# Patient Record
Sex: MALE | Race: WHITE | NOT HISPANIC OR LATINO | Employment: FULL TIME | URBAN - METROPOLITAN AREA
[De-identification: names, ages, dates, MRNs, and addresses within clinical notes are randomized per-mention and may not be internally consistent; named-entity substitution may affect disease eponyms.]

---

## 2019-08-22 ENCOUNTER — OFFICE VISIT (OUTPATIENT)
Dept: URGENT CARE | Facility: PHYSICIAN GROUP | Age: 69
End: 2019-08-22
Payer: COMMERCIAL

## 2019-08-22 VITALS
RESPIRATION RATE: 14 BRPM | BODY MASS INDEX: 28.23 KG/M2 | WEIGHT: 220 LBS | HEART RATE: 67 BPM | OXYGEN SATURATION: 98 % | HEIGHT: 74 IN | SYSTOLIC BLOOD PRESSURE: 132 MMHG | TEMPERATURE: 97.4 F | DIASTOLIC BLOOD PRESSURE: 80 MMHG

## 2019-08-22 DIAGNOSIS — R53.81 MALAISE AND FATIGUE: ICD-10-CM

## 2019-08-22 DIAGNOSIS — J06.9 VIRAL URI: ICD-10-CM

## 2019-08-22 DIAGNOSIS — R53.83 MALAISE AND FATIGUE: ICD-10-CM

## 2019-08-22 LAB
APPEARANCE UR: CLEAR
BILIRUB UR STRIP-MCNC: NEGATIVE MG/DL
COLOR UR AUTO: YELLOW
FLUAV+FLUBV AG SPEC QL IA: NEGATIVE
GLUCOSE UR STRIP.AUTO-MCNC: NEGATIVE MG/DL
INT CON NEG: NEGATIVE
INT CON POS: POSITIVE
KETONES UR STRIP.AUTO-MCNC: NORMAL MG/DL
LEUKOCYTE ESTERASE UR QL STRIP.AUTO: NEGATIVE
NITRITE UR QL STRIP.AUTO: NEGATIVE
PH UR STRIP.AUTO: 5.5 [PH] (ref 5–8)
PROT UR QL STRIP: NEGATIVE MG/DL
RBC UR QL AUTO: NEGATIVE
SP GR UR STRIP.AUTO: 1.03
UROBILINOGEN UR STRIP-MCNC: NORMAL MG/DL

## 2019-08-22 PROCEDURE — 99203 OFFICE O/P NEW LOW 30 MIN: CPT | Performed by: PHYSICIAN ASSISTANT

## 2019-08-22 PROCEDURE — 87804 INFLUENZA ASSAY W/OPTIC: CPT | Performed by: PHYSICIAN ASSISTANT

## 2019-08-22 PROCEDURE — 81002 URINALYSIS NONAUTO W/O SCOPE: CPT | Performed by: PHYSICIAN ASSISTANT

## 2019-08-22 SDOH — HEALTH STABILITY: MENTAL HEALTH: HOW OFTEN DO YOU HAVE A DRINK CONTAINING ALCOHOL?: MONTHLY OR LESS

## 2019-08-22 SDOH — HEALTH STABILITY: MENTAL HEALTH: HOW MANY STANDARD DRINKS CONTAINING ALCOHOL DO YOU HAVE ON A TYPICAL DAY?: 1 OR 2

## 2019-08-22 SDOH — HEALTH STABILITY: MENTAL HEALTH: HOW OFTEN DO YOU HAVE 6 OR MORE DRINKS ON ONE OCCASION?: NEVER

## 2019-08-22 ASSESSMENT — ENCOUNTER SYMPTOMS
NAUSEA: 0
VOMITING: 0
MYALGIAS: 1
HEADACHES: 1
COUGH: 0
CHILLS: 1
ABDOMINAL PAIN: 0
FATIGUE: 1
SORE THROAT: 0
FEVER: 1
CHANGE IN BOWEL HABIT: 0

## 2019-08-22 NOTE — PROGRESS NOTES
"Subjective:   Amando Pleitez is a 68 y.o. male who presents for Fever (x 24 hrs// body aches)        Influenza   This is a new problem. The current episode started yesterday. The problem occurs constantly. The problem has been unchanged. Associated symptoms include chills, fatigue, a fever, headaches (dull) and myalgias (\"kind of\"). Pertinent negatives include no abdominal pain, change in bowel habit, chest pain, congestion, coughing, nausea, sore throat or vomiting. Associated symptoms comments: lethargic. The symptoms are aggravated by stress and walking. He has tried nothing for the symptoms. The treatment provided no relief.     Review of Systems   Constitutional: Positive for chills, fatigue and fever.   HENT: Negative for congestion and sore throat.    Respiratory: Negative for cough.    Cardiovascular: Negative for chest pain.   Gastrointestinal: Negative for abdominal pain, change in bowel habit, nausea and vomiting.   Musculoskeletal: Positive for myalgias (\"kind of\").   Neurological: Positive for headaches (dull).       PMH:  has no past medical history of Allergy.  MEDS: No current outpatient medications on file.  ALLERGIES: No Known Allergies  SURGHX: History reviewed. No pertinent surgical history.  SOCHX:  reports that he has never smoked. He has never used smokeless tobacco. He reports that he drinks alcohol. He reports that he does not use drugs.  FH: Family history was reviewed, no pertinent findings to report   Objective:   /80   Pulse 67   Temp 36.3 °C (97.4 °F) (Temporal)   Resp 14   Ht 1.88 m (6' 2\")   Wt 99.8 kg (220 lb)   SpO2 98%   BMI 28.25 kg/m²   Physical Exam   Constitutional: He is oriented to person, place, and time. He appears well-developed and well-nourished.  Non-toxic appearance. No distress.   HENT:   Head: Normocephalic and atraumatic.   Right Ear: Hearing, tympanic membrane, external ear and ear canal normal.   Left Ear: Hearing, tympanic membrane, external ear and ear " canal normal.   Nose: No mucosal edema or rhinorrhea. Right sinus exhibits no maxillary sinus tenderness. Left sinus exhibits no maxillary sinus tenderness.   Mouth/Throat: Uvula is midline, oropharynx is clear and moist and mucous membranes are normal.   Eyes: Conjunctivae and lids are normal.   Neck: Neck supple.   Cardiovascular: Normal rate, regular rhythm, S1 normal, S2 normal and normal heart sounds. Exam reveals no gallop and no friction rub.   No murmur heard.  Pulmonary/Chest: Effort normal and breath sounds normal. No respiratory distress. He has no decreased breath sounds. He has no wheezes. He has no rhonchi. He has no rales.   Abdominal: Normal appearance.   Musculoskeletal:   Normal range of motion. Exhibits no edema and no tenderness.    Lymphadenopathy:     He has no cervical adenopathy.        Right cervical: No superficial cervical and no posterior cervical adenopathy present.       Left cervical: No superficial cervical and no posterior cervical adenopathy present.   Neurological: He is alert and oriented to person, place, and time. No cranial nerve deficit or sensory deficit.   Skin: Skin is warm, dry and intact. Capillary refill takes less than 2 seconds.   Psychiatric: He has a normal mood and affect. His speech is normal and behavior is normal. Judgment and thought content normal. Cognition and memory are normal.   Vitals reviewed.        Assessment/Plan:   1. Viral URI    2. Malaise and fatigue  - POCT Urinalysis  - POCT Influenza A/B    Flu testing negative.  UA unremarkable.  Patient advised that symptoms most likely due to other viral illness.  However pt advised that symptoms could also be due to another considerations include an illness that has not yet fully manifested itself yet. I will tx symptomatically at this time with f/u precautions.    VSS, no dyspnea, no SOB, and lungs CTA on PE.  Consistent with viral URI without lower respiratory involvement. Goals of care include symptomatic  control and prevention of lower respiratory spread. Signs of lower respiratory involvement discussed with pt.  Pt instructed to RTC if any of these are observed.     Drink plenty of fluids and rest.  NSAIDs as needed for headache and body aches.  APAP for fever control, and ibuprofen for throat pain/headache relief prn.    If you fail to improve in 2-4 days or symptoms worsen/new symptoms develop, RTC for reevaluation.    Differential diagnosis, natural history, supportive care, and indications for immediate follow-up discussed.

## 2019-08-28 ENCOUNTER — TELEPHONE (OUTPATIENT)
Dept: SCHEDULING | Facility: IMAGING CENTER | Age: 69
End: 2019-08-28

## 2020-03-09 ENCOUNTER — OFFICE VISIT (OUTPATIENT)
Dept: URGENT CARE | Facility: CLINIC | Age: 70
End: 2020-03-09
Payer: MEDICARE

## 2020-03-09 ENCOUNTER — HOSPITAL ENCOUNTER (OUTPATIENT)
Facility: MEDICAL CENTER | Age: 70
End: 2020-03-09
Attending: PHYSICIAN ASSISTANT
Payer: MEDICARE

## 2020-03-09 VITALS — HEART RATE: 95 BPM | TEMPERATURE: 98.1 F | OXYGEN SATURATION: 93 %

## 2020-03-09 DIAGNOSIS — R09.81 NASAL CONGESTION: ICD-10-CM

## 2020-03-09 LAB
FLUAV RNA SPEC QL NAA+PROBE: NEGATIVE
FLUAV+FLUBV AG SPEC QL IA: NEGATIVE
FLUBV RNA SPEC QL NAA+PROBE: NEGATIVE
INT CON NEG: NEGATIVE
INT CON POS: POSITIVE
S PYO DNA SPEC NAA+PROBE: NOT DETECTED

## 2020-03-09 PROCEDURE — 87502 INFLUENZA DNA AMP PROBE: CPT | Mod: XU

## 2020-03-09 PROCEDURE — 87804 INFLUENZA ASSAY W/OPTIC: CPT | Performed by: PHYSICIAN ASSISTANT

## 2020-03-09 PROCEDURE — 99213 OFFICE O/P EST LOW 20 MIN: CPT | Performed by: PHYSICIAN ASSISTANT

## 2020-03-09 PROCEDURE — 87486 CHLMYD PNEUM DNA AMP PROBE: CPT

## 2020-03-09 PROCEDURE — 87581 M.PNEUMON DNA AMP PROBE: CPT

## 2020-03-09 PROCEDURE — 87651 STREP A DNA AMP PROBE: CPT

## 2020-03-09 PROCEDURE — 87633 RESP VIRUS 12-25 TARGETS: CPT

## 2020-03-09 ASSESSMENT — ENCOUNTER SYMPTOMS
NAUSEA: 0
SHORTNESS OF BREATH: 0
ABDOMINAL PAIN: 0
COUGH: 1
WHEEZING: 0
MYALGIAS: 0
SPUTUM PRODUCTION: 0
CHILLS: 0
HEADACHES: 1
VOMITING: 0
FEVER: 0
DIARRHEA: 0

## 2020-03-09 NOTE — PROGRESS NOTES
"Subjective:      Amando Pleitez is a 69 y.o. male who presents with Nasal Congestion            HPI  Male who presents the clinic with a head cold.  He recently flew in from [facility] with a stop in [facility] within 14 days.  He presents today complaining of several days of \"head cold\" with a headache.  He also has associated nasal congestion and mild cough.  He denies any fevers, chills, body aches, abdominal pain, nausea, vomiting, diarrhea.  He has no further complaints.        Review of Systems   Constitutional: Negative for chills, fever and malaise/fatigue.   HENT: Positive for congestion.    Respiratory: Positive for cough. Negative for sputum production, shortness of breath and wheezing.    Cardiovascular: Negative for chest pain.   Gastrointestinal: Negative for abdominal pain, diarrhea, nausea and vomiting.   Musculoskeletal: Negative for myalgias.   Neurological: Positive for headaches.          Objective:     Pulse 95   Temp 36.7 °C (98.1 °F) (Temporal)   SpO2 93%      Physical Exam  Vitals signs reviewed.   Constitutional:       General: He is not in acute distress.     Appearance: Normal appearance. He is not ill-appearing, toxic-appearing or diaphoretic.      Comments: No signs of respiratory distress.   Standing in parking lot      HENT:      Nose: Congestion and rhinorrhea present.   Eyes:      Conjunctiva/sclera: Conjunctivae normal.   Pulmonary:      Effort: No tachypnea, accessory muscle usage or respiratory distress.   Neurological:      General: No focal deficit present.      Mental Status: He is alert and oriented to person, place, and time.   Psychiatric:         Mood and Affect: Mood normal.         Behavior: Behavior normal.       No past medical history on file. No past surgical history on file.   Social History     Socioeconomic History   • Marital status:      Spouse name: Not on file   • Number of children: Not on file   • Years of education: Not on file   • Highest " education level: Not on file   Occupational History   • Not on file   Social Needs   • Financial resource strain: Not on file   • Food insecurity     Worry: Not on file     Inability: Not on file   • Transportation needs     Medical: Not on file     Non-medical: Not on file   Tobacco Use   • Smoking status: Never Smoker   • Smokeless tobacco: Never Used   Substance and Sexual Activity   • Alcohol use: Yes     Frequency: Monthly or less     Drinks per session: 1 or 2     Binge frequency: Never   • Drug use: Never   • Sexual activity: Not on file   Lifestyle   • Physical activity     Days per week: Not on file     Minutes per session: Not on file   • Stress: Not on file   Relationships   • Social connections     Talks on phone: Not on file     Gets together: Not on file     Attends Spiritism service: Not on file     Active member of club or organization: Not on file     Attends meetings of clubs or organizations: Not on file     Relationship status: Not on file   • Intimate partner violence     Fear of current or ex partner: Not on file     Emotionally abused: Not on file     Physically abused: Not on file     Forced sexual activity: Not on file   Other Topics Concern   • Not on file   Social History Narrative   • Not on file    Patient has no known allergies.             Assessment/Plan:     1. Nasal congestion    - POCT Influenza A/B  - Influenza A/B By PCR (Adult - Flu Only); Future  - Group A Strep by PCR; Future    We will call back for influenza results if positive, otherwise assume negative..  Treatment of plenty of fluids, rest, ibuprofen Tylenol for fevers or chills, salt water gargles.  They will be contacted by [facility] if meets screening criteria for CoVID - 19.   Instructed to present to the emergency room with any severe difficulty breathing, severe abdominal pain, vomiting, fever or any other concerns.  Patient understood and agreed.

## 2020-03-10 ENCOUNTER — TELEPHONE (OUTPATIENT)
Dept: URGENT CARE | Facility: CLINIC | Age: 70
End: 2020-03-10

## 2020-03-10 LAB
C PNEUM DNA SPEC QL NAA+PROBE: NOT DETECTED
FLUAV H1 2009 PAND RNA SPEC QL NAA+PROBE: NOT DETECTED
FLUAV H1 RNA SPEC QL NAA+PROBE: NOT DETECTED
FLUAV H3 RNA SPEC QL NAA+PROBE: NOT DETECTED
FLUAV RNA SPEC QL NAA+PROBE: NOT DETECTED
FLUBV RNA SPEC QL NAA+PROBE: NOT DETECTED
HADV DNA SPEC QL NAA+PROBE: NOT DETECTED
HCOV RNA SPEC QL NAA+PROBE: DETECTED
HMPV RNA SPEC QL NAA+PROBE: NOT DETECTED
HPIV1 RNA SPEC QL NAA+PROBE: NOT DETECTED
HPIV2 RNA SPEC QL NAA+PROBE: NOT DETECTED
HPIV3 RNA SPEC QL NAA+PROBE: NOT DETECTED
HPIV4 RNA SPEC QL NAA+PROBE: NOT DETECTED
M PNEUMO DNA SPEC QL NAA+PROBE: NOT DETECTED
RSV A RNA SPEC QL NAA+PROBE: NOT DETECTED
RSV B RNA SPEC QL NAA+PROBE: NOT DETECTED
RV+EV RNA SPEC QL NAA+PROBE: NOT DETECTED

## 2020-10-23 ENCOUNTER — TELEPHONE (OUTPATIENT)
Dept: SCHEDULING | Facility: IMAGING CENTER | Age: 70
End: 2020-10-23

## 2020-10-27 ENCOUNTER — OFFICE VISIT (OUTPATIENT)
Dept: MEDICAL GROUP | Facility: PHYSICIAN GROUP | Age: 70
End: 2020-10-27
Payer: MEDICARE

## 2020-10-27 VITALS
BODY MASS INDEX: 28.99 KG/M2 | HEART RATE: 86 BPM | DIASTOLIC BLOOD PRESSURE: 80 MMHG | TEMPERATURE: 98.6 F | WEIGHT: 214 LBS | OXYGEN SATURATION: 96 % | HEIGHT: 72 IN | RESPIRATION RATE: 16 BRPM | SYSTOLIC BLOOD PRESSURE: 134 MMHG

## 2020-10-27 DIAGNOSIS — Z13.6 SCREENING FOR CARDIOVASCULAR CONDITION: ICD-10-CM

## 2020-10-27 DIAGNOSIS — R41.3 MEMORY CHANGES: ICD-10-CM

## 2020-10-27 DIAGNOSIS — Z23 NEED FOR VACCINATION: ICD-10-CM

## 2020-10-27 DIAGNOSIS — Z13.21 ENCOUNTER FOR VITAMIN DEFICIENCY SCREENING: ICD-10-CM

## 2020-10-27 DIAGNOSIS — K59.01 SLOW TRANSIT CONSTIPATION: ICD-10-CM

## 2020-10-27 DIAGNOSIS — Z11.59 NEED FOR HEPATITIS C SCREENING TEST: ICD-10-CM

## 2020-10-27 DIAGNOSIS — Z13.29 SCREENING FOR THYROID DISORDER: ICD-10-CM

## 2020-10-27 DIAGNOSIS — Z13.1 SCREENING FOR DIABETES MELLITUS: ICD-10-CM

## 2020-10-27 DIAGNOSIS — Z13.0 ENCOUNTER FOR SCREENING FOR HEMATOLOGIC DISORDER: ICD-10-CM

## 2020-10-27 PROCEDURE — 90732 PPSV23 VACC 2 YRS+ SUBQ/IM: CPT | Performed by: NURSE PRACTITIONER

## 2020-10-27 PROCEDURE — 99204 OFFICE O/P NEW MOD 45 MIN: CPT | Mod: 25 | Performed by: NURSE PRACTITIONER

## 2020-10-27 PROCEDURE — G0009 ADMIN PNEUMOCOCCAL VACCINE: HCPCS | Performed by: NURSE PRACTITIONER

## 2020-10-27 SDOH — HEALTH STABILITY: MENTAL HEALTH: HOW OFTEN DO YOU HAVE A DRINK CONTAINING ALCOHOL?: 4 OR MORE TIMES A WEEK

## 2020-10-27 ASSESSMENT — PATIENT HEALTH QUESTIONNAIRE - PHQ9: CLINICAL INTERPRETATION OF PHQ2 SCORE: 0

## 2020-10-27 NOTE — PATIENT INSTRUCTIONS
Benefiber supplement    Increase water intake to at least 64 ounces per day and increase fruits and vegetable    Labs anytime soon, they are fasting    Follow up in 6-8 weeks

## 2020-10-27 NOTE — PROGRESS NOTES
"Chief Complaint   Patient presents with   • Establish Care         This is a 70 y.o.male patient that presents today with the following: Establish care with new PCP, discuss chronic conditions    Slow transit constipation  Patient reports \"bowel issues\" off and on for the past couple of months  He reports to me that he is up-to-date with colonoscopy, this was done in 2015 and was given a 10-year recall  He mostly complains of symptoms of constipation and increased intestinal gas  He does admit to me that he eats very little fruits and vegetables and drinks an adequate amounts of water however he is quite active  We did discuss at length constipation prevention to include increasing fluid intake as well as fiber  Could also consider taking MiraLAX over-the-counter 1 capful up to 3-4 times a day until regular and then daily as needed  Patient like to hold off on the MiraLAX and try increasing water and fiber    Memory changes  Patient reports issues with his memory  They are very subtle  Patient's wife accompanies him today and does report that his hearing has changed which could possibly be causing the changes to his memory  He is not getting lost, he does not forget the names of loved ones  However he has forgotten the names of people in his Restoration as he is a  but he is the  of a Restoration well over 300 people  Discussed with him normal memory changes we all age  Patient and his wife will continue to monitor this  He does deny any other associated neurological symptoms  He is fully oriented x4      No visits with results within 1 Month(s) from this visit.   Latest known visit with results is:   Hospital Outpatient Visit on 03/09/2020   Component Date Value   • Group A Strep by PCR 03/09/2020 Not Detected    • Influenza virus A RNA 03/09/2020 Negative    • Influenza virus B, PCR 03/09/2020 Negative    • Adenovirus, PCR 03/09/2020 Not Detected    • Coronavirus, PCR 03/09/2020 DETECTED*   • Human " Metapneumovirus, P* 03/09/2020 Not Detected    • Rhinovirus / Enterovirus* 03/09/2020 Not Detected    • Influenza virus A RNA 03/09/2020 Not Detected    • Influenza virus A H1 RNA 03/09/2020 Not Detected    • Influenza A 2009, H1N1, * 03/09/2020 Not Detected    • Influenza virus A H3 RNA 03/09/2020 Not Detected    • Influenza virus B, PCR 03/09/2020 Not Detected    • Parainfluenza virus 1, P* 03/09/2020 Not Detected    • Parainfluenza virus 2, P* 03/09/2020 Not Detected    • Parainfluenza virus 3, P* 03/09/2020 Not Detected    • Parainfluenza 4, PCR 03/09/2020 Not Detected    • Resp Syncytial Virus A, * 03/09/2020 Not Detected    • Resp Syncytial Virus B, * 03/09/2020 Not Detected    • Chlamydia pneumoniae, PCR 03/09/2020 Not Detected    • Mycoplasma pneumoniae, P* 03/09/2020 Not Detected          clinical course has been stable    No past medical history on file.    No past surgical history on file.    No family history on file.    Patient has no known allergies.    No current [facility]-ordered outpatient medications on file.     No current [facility]-ordered facility-administered medications on file.        Constitutional ROS: No unexpected change in weight, No weakness, No unexplained fevers, sweats, or chills  Pulmonary ROS: No chronic cough, sputum, or hemoptysis, No shortness of breath, No recent change in breathing  Cardiovascular ROS: No chest pain  Gastrointestinal ROS: Positive for constipation   Musculoskeletal/Extremities ROS: No clubbing, No peripheral edema, No pain, redness or swelling on the joints  Neurologic ROS: Normal development, No seizures, No weakness  Psychiatric ROS: Positive for memory changes    Physical exam:  /80   Pulse 86   Temp 37 °C (98.6 °F) (Temporal)   Resp 16   Ht 1.829 m (6')   Wt 97.1 kg (214 lb)   SpO2 96%   BMI 29.02 kg/m²   General Appearance: Very pleasant older male, alert, no distress, moderately overweight, well-groomed  Skin: Skin color, texture, turgor normal. No  rashes or lesions.  Neck: negative findings: no asymmetry, masses, or scars, no adenopathy, trachea midline and normal to palpitation  Lungs: negative findings: normal respiratory rate and rhythm, lungs clear to auscultation  Heart: negative. RRR without murmur, gallop, or rubs.  No ectopy.  Abdomen: Abdomen soft, non-tender. BS normal. No masses,  No organomegaly  Musculoskeletal: negative findings: no evidence of joint instability, no evidence of muscle atrophy, no deformities present  Peripheral Pulses: Normal  Neurologic: intact, CN II through XII grossly intact    Medical decision making/discussion:     Benefiber supplement    Increase water intake to at least 64 ounces per day and increase fruits and vegetable    Labs anytime soon, they are fasting    Follow up in 6-8 weeks    Amando was seen today for establish care.    Diagnoses and all orders for this visit:    Memory changes    Slow transit constipation    Need for hepatitis C screening test  -     HCV Scrn ( 5584-7187 1xLife); Future    Need for vaccination  -     Pneumovax Vaccine (PPSV23)    Screening for cardiovascular condition  -     Comp Metabolic Panel; Future  -     Lipid Profile; Future    Screening for diabetes mellitus  -     HEMOGLOBIN A1C; Future    Screening for thyroid disorder  -     TSH WITH REFLEX TO FT4; Future    Encounter for vitamin deficiency screening  -     VITAMIN D,25 HYDROXY; Future    Encounter for screening for hematologic disorder  -     CBC WITH DIFFERENTIAL; Future    Other orders  -     Cancel: REFERRAL TO GI FOR COLONOSCOPY        Return in about 2 months (around 2020) for Follow-up, Discuss Labs.        Please note that this dictation was created using voice recognition software. I have made every reasonable attempt to correct obvious errors, but I expect that there are errors of grammar and possibly content that I did not discover before finalizing the note.

## 2020-10-28 PROBLEM — K59.01 SLOW TRANSIT CONSTIPATION: Status: ACTIVE | Noted: 2020-10-28

## 2020-10-28 PROBLEM — R41.3 MEMORY CHANGES: Status: ACTIVE | Noted: 2020-10-28

## 2020-10-28 NOTE — ASSESSMENT & PLAN NOTE
"Patient reports \"bowel issues\" off and on for the past couple of months  He reports to me that he is up-to-date with colonoscopy, this was done in 2015 and was given a 10-year recall  He mostly complains of symptoms of constipation and increased intestinal gas  He does admit to me that he eats very little fruits and vegetables and drinks an adequate amounts of water however he is quite active  We did discuss at length constipation prevention to include increasing fluid intake as well as fiber  Could also consider taking MiraLAX over-the-counter 1 capful up to 3-4 times a day until regular and then daily as needed  Patient like to hold off on the MiraLAX and try increasing water and fiber  "

## 2020-10-28 NOTE — ASSESSMENT & PLAN NOTE
Patient reports issues with his memory  They are very subtle  Patient's wife accompanies him today and does report that his hearing has changed which could possibly be causing the changes to his memory  He is not getting lost, he does not forget the names of loved ones  However he has forgotten the names of people in his Denominational as he is a  but he is the  of a Denominational well over 300 people  Discussed with him normal memory changes we all age  Patient and his wife will continue to monitor this  He does deny any other associated neurological symptoms  He is fully oriented x4

## 2021-01-11 ENCOUNTER — HOSPITAL ENCOUNTER (OUTPATIENT)
Dept: LAB | Facility: MEDICAL CENTER | Age: 71
End: 2021-01-11
Attending: NURSE PRACTITIONER
Payer: MEDICARE

## 2021-01-11 DIAGNOSIS — Z13.21 ENCOUNTER FOR VITAMIN DEFICIENCY SCREENING: ICD-10-CM

## 2021-01-11 DIAGNOSIS — Z11.59 NEED FOR HEPATITIS C SCREENING TEST: ICD-10-CM

## 2021-01-11 DIAGNOSIS — Z13.0 ENCOUNTER FOR SCREENING FOR HEMATOLOGIC DISORDER: ICD-10-CM

## 2021-01-11 DIAGNOSIS — Z13.1 SCREENING FOR DIABETES MELLITUS: ICD-10-CM

## 2021-01-11 DIAGNOSIS — Z13.6 SCREENING FOR CARDIOVASCULAR CONDITION: ICD-10-CM

## 2021-01-11 DIAGNOSIS — Z13.29 SCREENING FOR THYROID DISORDER: ICD-10-CM

## 2021-03-03 DIAGNOSIS — Z23 NEED FOR VACCINATION: ICD-10-CM

## 2021-03-17 ENCOUNTER — IMMUNIZATION (OUTPATIENT)
Dept: FAMILY PLANNING/WOMEN'S HEALTH CLINIC | Facility: IMMUNIZATION CENTER | Age: 71
End: 2021-03-17
Attending: INTERNAL MEDICINE
Payer: MEDICARE

## 2021-03-17 DIAGNOSIS — Z23 NEED FOR VACCINATION: ICD-10-CM

## 2021-03-17 DIAGNOSIS — Z23 ENCOUNTER FOR VACCINATION: Primary | ICD-10-CM

## 2021-03-17 PROCEDURE — 91300 PFIZER SARS-COV-2 VACCINE: CPT | Performed by: INTERNAL MEDICINE

## 2021-03-17 PROCEDURE — 0001A PFIZER SARS-COV-2 VACCINE: CPT | Performed by: INTERNAL MEDICINE

## 2021-04-08 ENCOUNTER — IMMUNIZATION (OUTPATIENT)
Dept: FAMILY PLANNING/WOMEN'S HEALTH CLINIC | Facility: IMMUNIZATION CENTER | Age: 71
End: 2021-04-08
Attending: INTERNAL MEDICINE
Payer: MEDICARE

## 2021-04-08 DIAGNOSIS — Z23 ENCOUNTER FOR VACCINATION: Primary | ICD-10-CM

## 2021-04-08 PROCEDURE — 91300 PFIZER SARS-COV-2 VACCINE: CPT

## 2021-04-08 PROCEDURE — 0002A PFIZER SARS-COV-2 VACCINE: CPT

## 2021-11-15 ENCOUNTER — OFFICE VISIT (OUTPATIENT)
Dept: MEDICAL GROUP | Facility: PHYSICIAN GROUP | Age: 71
End: 2021-11-15
Payer: MEDICARE

## 2021-11-15 VITALS
OXYGEN SATURATION: 97 % | HEIGHT: 72 IN | TEMPERATURE: 97.8 F | BODY MASS INDEX: 29.31 KG/M2 | WEIGHT: 216.4 LBS | DIASTOLIC BLOOD PRESSURE: 60 MMHG | RESPIRATION RATE: 18 BRPM | SYSTOLIC BLOOD PRESSURE: 130 MMHG | HEART RATE: 65 BPM

## 2021-11-15 DIAGNOSIS — E78.5 HYPERLIPIDEMIA, UNSPECIFIED HYPERLIPIDEMIA TYPE: ICD-10-CM

## 2021-11-15 DIAGNOSIS — R41.3 MEMORY CHANGES: ICD-10-CM

## 2021-11-15 DIAGNOSIS — Z00.00 HEALTHCARE MAINTENANCE: ICD-10-CM

## 2021-11-15 DIAGNOSIS — K59.01 SLOW TRANSIT CONSTIPATION: ICD-10-CM

## 2021-11-15 PROCEDURE — 99213 OFFICE O/P EST LOW 20 MIN: CPT | Performed by: STUDENT IN AN ORGANIZED HEALTH CARE EDUCATION/TRAINING PROGRAM

## 2021-11-15 ASSESSMENT — PATIENT HEALTH QUESTIONNAIRE - PHQ9: CLINICAL INTERPRETATION OF PHQ2 SCORE: 0

## 2021-11-15 NOTE — PROGRESS NOTES
Subjective:     CC:  Diagnoses of Hyperlipidemia, unspecified hyperlipidemia type, Memory changes, Slow transit constipation, and Healthcare maintenance were pertinent to this visit.    HISTORY OF THE PRESENT ILLNESS: Patient is a 71 y.o. male. This pleasant patient is here today to establish care. His prior PCP was KOREY Arevalo.    1.  Hyperlipidemia  Mr. Pleitez is unsure of how he received this diagnosis.  We have no lipid results on him.    2.  Memory changes  He feels that his memory is stable.  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  He states that his memory is not quite as good as it used to be but he feels that it is still quite adequate.    3.  Slow transit constipation  He feels that this is mostly resolved at this point.    Active Diagnosis:    Patient Active Problem List   Diagnosis   • Hyperlipidemia   • Abnormal colonoscopy   • Memory changes   • Slow transit constipation        No current Epic-ordered outpatient medications on file.     No current Epic-ordered facility-administered medications on file.     ROS:   Gen: No fevers/chills, no changes in weight  HEENT: No changes in vision/hearing, sore throat.  Pulm: No cough, unexplained SOB.  CV: No chest pain/pressure, no palpitations  GI: No nausea/vomiting, no diarrhea  : No dysuria/nocturia  MSk: No myalgias  Skin: No rash/skin changes  Neuro: No headaches, no numbness/tingling  Heme/Lymph: no easy bruising      Objective:     Exam: /60 (BP Location: Left arm, Patient Position: Sitting, BP Cuff Size: Large adult)   Pulse 65   Temp 36.6 °C (97.8 °F) (Temporal)   Resp 18   Ht 1.829 m (6')   Wt 98.2 kg (216 lb 6.4 oz)   SpO2 97%  Body mass index is 29.35 kg/m².    General: Normal appearing. No distress.  HEENT: Normocephalic. Eyes conjunctiva clear lids without ptosis, pupils equal and reactive to light accommodation. Ears normal shape and contour, canals are clear bilaterally,  tympanic membranes are benign, nasal mucosa benign, oropharynx is without erythema, edema or exudates.   Neck: Supple without JVD or bruit. Thyroid is not enlarged.  Pulmonary: Clear to ausculation.  Normal effort. No rales, ronchi, or wheezing.  Cardiovascular: Regular rate and rhythm without murmur. Radial pulses are intact and equal bilaterally.  Abdomen: Soft, nontender, nondistended. Normal bowel sounds. Liver and spleen are not palpable  Neurologic: Grossly nonfocal.  CN II through XII intact.  Lymph: No cervical or supraclavicular lymph nodes are palpable  Skin: Warm and dry.  No obvious lesions.  Musculoskeletal: Normal gait. No extremity cyanosis, clubbing, or edema.  Psych: Normal mood and affect. Alert and oriented x3. Judgment and insight are normal.    A chaperone was offered to the patient during today's exam. Patient declined chaperone.    Labs:   No pertinent labs available    Assessment & Plan:   71 y.o. male with the following -    1. Hyperlipidemia, unspecified hyperlipidemia type  - lipid profile    2. Memory changes  -Chronic, stable.  Patient is very high functioning.    3. Slow transit constipation  -Resolved.    4.  Healthcare maintenance  -We discussed diet/exercise/healthy weight maintenance.  Discussed the need for biannual dentistry visits and to always wear a seatbelt.  -He is already scheduled for his influenza vaccine and COVID-19 booster at the pharmacy this afternoon.  -CBC, CMP, lipid profile.        Return in about 1 year (around 11/15/2022).    Please note that this dictation was created using voice recognition software. I have made every reasonable attempt to correct obvious errors, but I expect that there are errors of grammar and possibly content that I did not discover before finalizing the note.      Fox Pandya PA-C 11/15/2021

## 2021-12-07 ENCOUNTER — HOSPITAL ENCOUNTER (OUTPATIENT)
Dept: LAB | Facility: MEDICAL CENTER | Age: 71
End: 2021-12-07
Attending: STUDENT IN AN ORGANIZED HEALTH CARE EDUCATION/TRAINING PROGRAM
Payer: MEDICARE

## 2021-12-07 DIAGNOSIS — R74.8 ELEVATED ALKALINE PHOSPHATASE LEVEL: ICD-10-CM

## 2021-12-07 DIAGNOSIS — Z00.00 HEALTHCARE MAINTENANCE: ICD-10-CM

## 2021-12-07 DIAGNOSIS — E78.5 HYPERLIPIDEMIA, UNSPECIFIED HYPERLIPIDEMIA TYPE: ICD-10-CM

## 2021-12-07 DIAGNOSIS — R73.01 ELEVATED FASTING BLOOD SUGAR: ICD-10-CM

## 2021-12-07 LAB
ALBUMIN SERPL BCP-MCNC: 4.2 G/DL (ref 3.2–4.9)
ALBUMIN/GLOB SERPL: 1.8 G/DL
ALP SERPL-CCNC: 123 U/L (ref 30–99)
ALT SERPL-CCNC: 30 U/L (ref 2–50)
ANION GAP SERPL CALC-SCNC: 12 MMOL/L (ref 7–16)
AST SERPL-CCNC: 22 U/L (ref 12–45)
BILIRUB SERPL-MCNC: 0.9 MG/DL (ref 0.1–1.5)
BUN SERPL-MCNC: 22 MG/DL (ref 8–22)
CALCIUM SERPL-MCNC: 9.1 MG/DL (ref 8.5–10.5)
CHLORIDE SERPL-SCNC: 106 MMOL/L (ref 96–112)
CHOLEST SERPL-MCNC: 225 MG/DL (ref 100–199)
CO2 SERPL-SCNC: 22 MMOL/L (ref 20–33)
CREAT SERPL-MCNC: 0.9 MG/DL (ref 0.5–1.4)
ERYTHROCYTE [DISTWIDTH] IN BLOOD BY AUTOMATED COUNT: 48.1 FL (ref 35.9–50)
FASTING STATUS PATIENT QL REPORTED: NORMAL
GLOBULIN SER CALC-MCNC: 2.3 G/DL (ref 1.9–3.5)
GLUCOSE SERPL-MCNC: 125 MG/DL (ref 65–99)
HCT VFR BLD AUTO: 50.7 % (ref 42–52)
HDLC SERPL-MCNC: 54 MG/DL
HGB BLD-MCNC: 16.4 G/DL (ref 14–18)
LDLC SERPL CALC-MCNC: 155 MG/DL
MCH RBC QN AUTO: 30.9 PG (ref 27–33)
MCHC RBC AUTO-ENTMCNC: 32.3 G/DL (ref 33.7–35.3)
MCV RBC AUTO: 95.7 FL (ref 81.4–97.8)
PLATELET # BLD AUTO: 259 K/UL (ref 164–446)
PMV BLD AUTO: 9.6 FL (ref 9–12.9)
POTASSIUM SERPL-SCNC: 4.6 MMOL/L (ref 3.6–5.5)
PROT SERPL-MCNC: 6.5 G/DL (ref 6–8.2)
RBC # BLD AUTO: 5.3 M/UL (ref 4.7–6.1)
SODIUM SERPL-SCNC: 140 MMOL/L (ref 135–145)
TRIGL SERPL-MCNC: 78 MG/DL (ref 0–149)
WBC # BLD AUTO: 3.7 K/UL (ref 4.8–10.8)

## 2021-12-07 PROCEDURE — 36415 COLL VENOUS BLD VENIPUNCTURE: CPT

## 2021-12-07 PROCEDURE — 80061 LIPID PANEL: CPT

## 2021-12-07 PROCEDURE — 85027 COMPLETE CBC AUTOMATED: CPT

## 2021-12-07 PROCEDURE — 80053 COMPREHEN METABOLIC PANEL: CPT

## 2021-12-07 RX ORDER — ATORVASTATIN CALCIUM 40 MG/1
40 TABLET, FILM COATED ORAL DAILY
Qty: 30 TABLET | Refills: 2 | Status: SHIPPED | OUTPATIENT
Start: 2021-12-07 | End: 2022-01-07

## 2021-12-07 NOTE — PROGRESS NOTES
Mr. Pleitez was noted to have elevated fasting glucose of 125, elevated alkaline phosphatase of 123 and an ASCVD score of 31.5% after routine labs resulted on 12/7/2021.    He was contacted via [employer] and asked to get a 5 prime nucleotidase, GGT, hemoglobin A1c at his soonest convenience.    He was provided with a prescription for atorvastatin 40 mg daily to begin right away dispense 30.  Refill 2.    He was asked to make an appointment as soon after getting these labs as possible.      Fox Pandya PA-C

## 2022-01-03 ENCOUNTER — HOSPITAL ENCOUNTER (OUTPATIENT)
Dept: LAB | Facility: MEDICAL CENTER | Age: 72
End: 2022-01-03
Attending: STUDENT IN AN ORGANIZED HEALTH CARE EDUCATION/TRAINING PROGRAM
Payer: MEDICARE

## 2022-01-03 DIAGNOSIS — R74.8 ELEVATED ALKALINE PHOSPHATASE LEVEL: ICD-10-CM

## 2022-01-03 DIAGNOSIS — R73.01 ELEVATED FASTING BLOOD SUGAR: ICD-10-CM

## 2022-01-03 LAB
EST. AVERAGE GLUCOSE BLD GHB EST-MCNC: 128 MG/DL
GGT SERPL-CCNC: 34 U/L (ref 7–51)
HBA1C MFR BLD: 6.1 % (ref 4–5.6)

## 2022-01-03 PROCEDURE — 83036 HEMOGLOBIN GLYCOSYLATED A1C: CPT | Mod: GA

## 2022-01-03 PROCEDURE — 82977 ASSAY OF GGT: CPT

## 2022-01-03 PROCEDURE — 83915 ASSAY OF NUCLEOTIDASE: CPT

## 2022-01-03 PROCEDURE — 36415 COLL VENOUS BLD VENIPUNCTURE: CPT | Mod: GA

## 2022-01-05 DIAGNOSIS — R74.8 ELEVATED ALKALINE PHOSPHATASE LEVEL: ICD-10-CM

## 2022-01-05 LAB — 5NT SERPL-CCNC: 9 U/L (ref 0–15)

## 2022-01-06 NOTE — PROGRESS NOTES
Recent GGT and 5 prime nucleotidase come back normal.    I contacted the patient via [employer] and asked him to proceed to the lab for a bone panel.    Fox Pandya PA-C

## 2022-01-07 RX ORDER — ATORVASTATIN CALCIUM 40 MG/1
40 TABLET, FILM COATED ORAL DAILY
Qty: 30 TABLET | Refills: 2 | Status: SHIPPED | OUTPATIENT
Start: 2022-01-07 | End: 2022-04-01

## 2022-01-12 ENCOUNTER — TELEPHONE (OUTPATIENT)
Dept: MEDICAL GROUP | Facility: PHYSICIAN GROUP | Age: 72
End: 2022-01-12

## 2022-01-13 ENCOUNTER — NON-PROVIDER VISIT (OUTPATIENT)
Dept: MEDICAL GROUP | Facility: PHYSICIAN GROUP | Age: 72
End: 2022-01-13
Payer: MEDICARE

## 2022-01-13 VITALS — WEIGHT: 205 LBS | BODY MASS INDEX: 27.8 KG/M2

## 2022-01-13 PROCEDURE — 99999 PR NO CHARGE: CPT | Performed by: STUDENT IN AN ORGANIZED HEALTH CARE EDUCATION/TRAINING PROGRAM

## 2022-01-13 ASSESSMENT — FIBROSIS 4 INDEX: FIB4 SCORE: 1.07

## 2022-01-13 NOTE — PROGRESS NOTES
RN-CDE Note    Subjective:     HPI:  Amando Pleitez is a 71 y.o. old patient who is seen by the Diabetes Nurse Specialist today for review of his pre diabetes, wants to know what lifestyle changes he needs to make in order to stay off of diabetes medication   Exercise: sedentary most of the time, will hike or ski on occasion.   Diet: tends to skip breakfast and sometimes lunch and just eat dinner.   Discussed foods that contain carbohydrates.  Limit carbohydrates to no more than 45 gm per meal on days when activity is light (30 minutes or less of exercise), may increase to 60 gm per meal on days he hikes or skis all day.    Patient's body mass index is 27.8 kg/m². Exercise and nutrition counseling were performed at this visit.      Health Maintenance:   Health Maintenance Due   Topic Date Due   • Annual Wellness Visit  Never done   • HEPATITIS C SCREENING  Never done         DM:   Last A1c:   Lab Results   Component Value Date/Time    HBA1C 6.1 (H) 01/03/2022 11:33 AM      Dyslipidemia:    Lab Results   Component Value Date/Time    CHOLSTRLTOT 225 (H) 12/07/2021 07:37 AM     (H) 12/07/2021 07:37 AM    HDL 54 12/07/2021 07:37 AM    TRIGLYCERIDE 78 12/07/2021 07:37 AM         Currently Rx Statin: Yes     He  reports that he has never smoked. He has never used smokeless tobacco.      Plan:     Discussed and educated on:   Discussed role of exercise on weight management and blood glucose control.   Limit carbohydrates: written material provided with foods that are carbohydrates.   Increase activity to include at least 30 minutes of moderate exercise daily  Follow up in April for recheck of his A1c and weight.

## 2022-01-13 NOTE — TELEPHONE ENCOUNTER
VOICEMAIL  1. Caller Name: Jessenia (Amando's Wife)                      Call Back Number: [phone]    2. Message: Jessenia left  requesting that Amando is to get an appointment with the Diabetes RN and would like me to call back and schedule him an appointment.     3. Patient approves office to leave a detailed voicemail/MyChart message: N\A    Phone Number Called: [phone]    Call outcome: Spoke to patient regarding message below.    Message: Spoke with Amando and was able to schedule him for tomorrow 1/13/2022 with the Diabetes RN

## 2022-01-20 ENCOUNTER — HOSPITAL ENCOUNTER (OUTPATIENT)
Dept: LAB | Facility: MEDICAL CENTER | Age: 72
End: 2022-01-20
Attending: STUDENT IN AN ORGANIZED HEALTH CARE EDUCATION/TRAINING PROGRAM
Payer: MEDICARE

## 2022-01-20 DIAGNOSIS — R74.8 ELEVATED ALKALINE PHOSPHATASE LEVEL: ICD-10-CM

## 2022-01-20 LAB — PTH-INTACT SERPL-MCNC: 99.4 PG/ML (ref 14–72)

## 2022-01-20 PROCEDURE — 84155 ASSAY OF PROTEIN SERUM: CPT

## 2022-01-20 PROCEDURE — 83970 ASSAY OF PARATHORMONE: CPT

## 2022-01-20 PROCEDURE — 36415 COLL VENOUS BLD VENIPUNCTURE: CPT

## 2022-01-20 PROCEDURE — 84156 ASSAY OF PROTEIN URINE: CPT

## 2022-01-20 PROCEDURE — 84165 PROTEIN E-PHORESIS SERUM: CPT

## 2022-01-25 LAB
ALBUMIN 24H MFR UR ELPH: 100 %
ALBUMIN SERPL ELPH-MCNC: 3.81 G/DL (ref 3.75–5.01)
ALPHA1 GLOB 24H MFR UR ELPH: 0 %
ALPHA1 GLOB SERPL ELPH-MCNC: 0.34 G/DL (ref 0.19–0.46)
ALPHA2 GLOB 24H MFR UR ELPH: 0 %
ALPHA2 GLOB SERPL ELPH-MCNC: 0.62 G/DL (ref 0.48–1.05)
B-GLOBULIN 24H MFR UR ELPH: 0 %
B-GLOBULIN SERPL ELPH-MCNC: 1.05 G/DL (ref 0.48–1.1)
COLLECT DURATION TIME SPEC: NORMAL HRS
EER MONOCLONAL PROTEIN STUDY, 24 HOUR U Q5964: NORMAL
GAMMA GLOB 24H MFR UR ELPH: 0 %
GAMMA GLOB SERPL ELPH-MCNC: 0.89 G/DL (ref 0.62–1.51)
INTERPRETATION SERPL IFE-IMP: NORMAL
INTERPRETATION UR IFE-IMP: NORMAL
M PROTEIN 24H MFR UR ELPH: 0 %
M PROTEIN 24H UR ELPH-MRATE: NORMAL MG/24 HRS
MONOCLON BAND OBS SERPL: NORMAL
PATHOLOGY STUDY: NORMAL
PROT 24H UR-MRATE: NORMAL MG/D (ref 40–150)
PROT SERPL-MCNC: 6.7 G/DL (ref 6.3–8.2)
PROT UR-MCNC: 38 MG/DL
SPECIMEN VOL ?TM UR: NORMAL ML

## 2022-01-26 ENCOUNTER — HOSPITAL ENCOUNTER (OUTPATIENT)
Dept: LAB | Facility: MEDICAL CENTER | Age: 72
End: 2022-01-26
Attending: STUDENT IN AN ORGANIZED HEALTH CARE EDUCATION/TRAINING PROGRAM
Payer: MEDICARE

## 2022-01-26 ENCOUNTER — OFFICE VISIT (OUTPATIENT)
Dept: MEDICAL GROUP | Facility: PHYSICIAN GROUP | Age: 72
End: 2022-01-26
Payer: MEDICARE

## 2022-01-26 VITALS
WEIGHT: 208 LBS | BODY MASS INDEX: 28.17 KG/M2 | HEIGHT: 72 IN | SYSTOLIC BLOOD PRESSURE: 138 MMHG | RESPIRATION RATE: 16 BRPM | OXYGEN SATURATION: 96 % | DIASTOLIC BLOOD PRESSURE: 82 MMHG | HEART RATE: 77 BPM | TEMPERATURE: 97.9 F

## 2022-01-26 DIAGNOSIS — Z00.00 HEALTHCARE MAINTENANCE: ICD-10-CM

## 2022-01-26 DIAGNOSIS — E21.3 HYPERPARATHYROIDISM (HCC): ICD-10-CM

## 2022-01-26 DIAGNOSIS — R79.89 ELEVATED PARATHYROID HORMONE: ICD-10-CM

## 2022-01-26 LAB
25(OH)D3 SERPL-MCNC: 14 NG/ML (ref 30–100)
HCV AB SER QL: NORMAL

## 2022-01-26 PROCEDURE — 82306 VITAMIN D 25 HYDROXY: CPT

## 2022-01-26 PROCEDURE — 99214 OFFICE O/P EST MOD 30 MIN: CPT | Performed by: STUDENT IN AN ORGANIZED HEALTH CARE EDUCATION/TRAINING PROGRAM

## 2022-01-26 PROCEDURE — 36415 COLL VENOUS BLD VENIPUNCTURE: CPT | Mod: GA

## 2022-01-26 PROCEDURE — G0472 HEP C SCREEN HIGH RISK/OTHER: HCPCS | Mod: GA

## 2022-01-26 ASSESSMENT — FIBROSIS 4 INDEX: FIB4 SCORE: 1.07

## 2022-01-26 NOTE — PROGRESS NOTES
CC: Elevated parathyroid hormone.    HISTORY OF THE PRESENT ILLNESS: Patient is a 71 y.o. male. This pleasant patient is here today to discuss recent lab findings.    1.  Elevated parathyroid hormone  Patient was found to have elevated parathyroid hormone secondary to work-up for elevated alkaline phosphatase.  He was unable to get the vitamin D labs because of an insurance issue.  He is here today to ask questions about his current status.      No problem-specific Assessment & Plan notes found for this encounter.      Current Outpatient Medications Ordered in Epic   Medication Sig Dispense Refill   • atorvastatin (LIPITOR) 40 MG Tab TAKE 1 TABLET BY MOUTH EVERY DAY 30 Tablet 2     No current Epic-ordered facility-administered medications on file.     ROS:   Gen: no fevers/chills, unplanned changes in weight  Eyes: no changes in vision  ENT: no sore throat, no hearing loss, no bloody nose  Pulm: no sob, no cough  CV: no chest pain/pressure, no palpitations  : no dysuria/nocturia > once per night  MSk: no myalgias/bone pain  Neuro: no headaches, no numbness/tingling.  No fatigue      Objective:     Exam: /82 (BP Location: Right arm, Patient Position: Sitting, BP Cuff Size: Adult)   Pulse 77   Temp 36.6 °C (97.9 °F) (Temporal)   Resp 16   Ht 1.829 m (6')   Wt 94.3 kg (208 lb)   SpO2 96%  Body mass index is 28.21 kg/m².    General: Normal appearing. No distress.  Neck: Supple without JVD or bruit. Thyroid is not enlarged, no nodules could be appreciated.  Neurologic: Grossly nonfocal  Lymph: No cervical or supraclavicular lymph nodes are palpable  Skin: Warm and dry.  No obvious lesions.    A chaperone was offered to the patient during today's exam. Patient declined chaperone.    Labs:   12/7/2021:  -CMP, elevated alkaline phosphatase of 123    1/3/2022:  -GGT 34   -5 prime nucleotidase 9    1/20/2022:  -Parathyroid hormone elevated at 99.4  -SPEP, negative    Assessment & Plan:   71 y.o. male with the  following -    1.  Elevated parathyroid hormone.  -Undiagnosed new problem with uncertain prognosis.  -DDx: Vitamin D deficiency versus malignancy.  Renal function appears to be intact and thus an unlikely cause of elevated parathyroid hormone.  - VITAMIN D,25 HYDROXY; Future  - Referral to Endocrinology    Return if symptoms worsen or fail to improve.    Please note that this dictation was created using voice recognition software. I have made every reasonable attempt to correct obvious errors, but I expect that there are errors of grammar and possibly content that I did not discover before finalizing the note.    Fox Pandya PA-C 1/26/2022

## 2022-01-27 DIAGNOSIS — R74.8 ELEVATED ALKALINE PHOSPHATASE LEVEL: ICD-10-CM

## 2022-01-27 DIAGNOSIS — R79.89 ELEVATED PARATHYROID HORMONE: ICD-10-CM

## 2022-03-22 RX ORDER — CHOLECALCIFEROL (VITAMIN D3) 1250 MCG
CAPSULE ORAL
Qty: 8 CAPSULE | Refills: 0 | Status: SHIPPED | OUTPATIENT
Start: 2022-03-22 | End: 2022-05-24

## 2022-04-14 ENCOUNTER — NON-PROVIDER VISIT (OUTPATIENT)
Dept: MEDICAL GROUP | Facility: PHYSICIAN GROUP | Age: 72
End: 2022-04-14
Payer: MEDICARE

## 2022-04-14 PROCEDURE — 99211 OFF/OP EST MAY X REQ PHY/QHP: CPT | Performed by: STUDENT IN AN ORGANIZED HEALTH CARE EDUCATION/TRAINING PROGRAM

## 2022-04-14 NOTE — PROGRESS NOTES
Patient seen as he was recently diagnosed with pre diabetes.  Fasting glucose of 125 and A1c of 6.1.   Discussed lifestyle changes, watching carbohydrate intake and increased exercise.   Not always consistent with eating times but trying to eat healthy most of the time.  Active lifestyle, states more active during the summer.   States about 10 lbs heavier than he was last year, discussed weight loss would decrease insulin resistance.

## 2022-05-16 ENCOUNTER — APPOINTMENT (OUTPATIENT)
Dept: RADIOLOGY | Facility: MEDICAL CENTER | Age: 72
End: 2022-05-16
Attending: EMERGENCY MEDICINE
Payer: MEDICARE

## 2022-05-16 ENCOUNTER — TELEPHONE (OUTPATIENT)
Dept: MEDICAL GROUP | Facility: PHYSICIAN GROUP | Age: 72
End: 2022-05-16
Payer: MEDICARE

## 2022-05-16 ENCOUNTER — HOSPITAL ENCOUNTER (OUTPATIENT)
Facility: MEDICAL CENTER | Age: 72
End: 2022-05-16
Attending: PHYSICIAN ASSISTANT
Payer: MEDICARE

## 2022-05-16 ENCOUNTER — OFFICE VISIT (OUTPATIENT)
Dept: URGENT CARE | Facility: PHYSICIAN GROUP | Age: 72
End: 2022-05-16
Payer: MEDICARE

## 2022-05-16 ENCOUNTER — HOSPITAL ENCOUNTER (OUTPATIENT)
Dept: RADIOLOGY | Facility: MEDICAL CENTER | Age: 72
End: 2022-05-16
Attending: PHYSICIAN ASSISTANT
Payer: MEDICARE

## 2022-05-16 ENCOUNTER — APPOINTMENT (OUTPATIENT)
Dept: RADIOLOGY | Facility: MEDICAL CENTER | Age: 72
End: 2022-05-16
Payer: MEDICARE

## 2022-05-16 ENCOUNTER — HOSPITAL ENCOUNTER (OUTPATIENT)
Facility: MEDICAL CENTER | Age: 72
End: 2022-05-17
Attending: EMERGENCY MEDICINE | Admitting: HOSPITALIST
Payer: MEDICARE

## 2022-05-16 VITALS
OXYGEN SATURATION: 96 % | RESPIRATION RATE: 14 BRPM | HEIGHT: 72 IN | WEIGHT: 210.6 LBS | HEART RATE: 66 BPM | BODY MASS INDEX: 28.53 KG/M2 | DIASTOLIC BLOOD PRESSURE: 76 MMHG | SYSTOLIC BLOOD PRESSURE: 140 MMHG | TEMPERATURE: 98 F

## 2022-05-16 DIAGNOSIS — R05.9 COUGH: ICD-10-CM

## 2022-05-16 DIAGNOSIS — I26.99 BILATERAL PULMONARY EMBOLISM (HCC): ICD-10-CM

## 2022-05-16 DIAGNOSIS — R19.7 DIARRHEA, UNSPECIFIED TYPE: ICD-10-CM

## 2022-05-16 DIAGNOSIS — R53.83 FATIGUE, UNSPECIFIED TYPE: ICD-10-CM

## 2022-05-16 DIAGNOSIS — I26.99 ACUTE PULMONARY EMBOLISM, UNSPECIFIED PULMONARY EMBOLISM TYPE, UNSPECIFIED WHETHER ACUTE COR PULMONALE PRESENT (HCC): ICD-10-CM

## 2022-05-16 DIAGNOSIS — J90 BILATERAL PLEURAL EFFUSION: ICD-10-CM

## 2022-05-16 DIAGNOSIS — R06.02 SHORTNESS OF BREATH: Primary | ICD-10-CM

## 2022-05-16 DIAGNOSIS — R06.00 DYSPNEA, UNSPECIFIED TYPE: ICD-10-CM

## 2022-05-16 DIAGNOSIS — I26.99 PULMONARY INFARCT (HCC): ICD-10-CM

## 2022-05-16 PROBLEM — E78.2 MIXED HYPERLIPIDEMIA: Status: ACTIVE | Noted: 2022-05-16

## 2022-05-16 LAB
ALBUMIN SERPL BCP-MCNC: 4 G/DL (ref 3.2–4.9)
ALBUMIN/GLOB SERPL: 1.3 G/DL
ALP SERPL-CCNC: 114 U/L (ref 30–99)
ALT SERPL-CCNC: 21 U/L (ref 2–50)
ANION GAP SERPL CALC-SCNC: 12 MMOL/L (ref 7–16)
AST SERPL-CCNC: 13 U/L (ref 12–45)
BASOPHILS # BLD AUTO: 0.3 % (ref 0–1.8)
BASOPHILS # BLD: 0.03 K/UL (ref 0–0.12)
BILIRUB SERPL-MCNC: 2.5 MG/DL (ref 0.1–1.5)
BUN SERPL-MCNC: 13 MG/DL (ref 8–22)
CALCIUM SERPL-MCNC: 9.2 MG/DL (ref 8.4–10.2)
CHLORIDE SERPL-SCNC: 98 MMOL/L (ref 96–112)
CO2 SERPL-SCNC: 23 MMOL/L (ref 20–33)
CREAT SERPL-MCNC: 0.73 MG/DL (ref 0.5–1.4)
D DIMER PPP IA.FEU-MCNC: 0.87 UG/ML (FEU) (ref 0–0.5)
EKG IMPRESSION: NORMAL
EOSINOPHIL # BLD AUTO: 0.02 K/UL (ref 0–0.51)
EOSINOPHIL NFR BLD: 0.2 % (ref 0–6.9)
ERYTHROCYTE [DISTWIDTH] IN BLOOD BY AUTOMATED COUNT: 43.6 FL (ref 35.9–50)
EXTERNAL QUALITY CONTROL: NORMAL
FLUAV RNA SPEC QL NAA+PROBE: NEGATIVE
FLUAV+FLUBV AG SPEC QL IA: NORMAL
FLUBV RNA SPEC QL NAA+PROBE: NEGATIVE
GFR SERPLBLD CREATININE-BSD FMLA CKD-EPI: 98 ML/MIN/1.73 M 2
GLOBULIN SER CALC-MCNC: 3.2 G/DL (ref 1.9–3.5)
GLUCOSE SERPL-MCNC: 104 MG/DL (ref 65–99)
HCT VFR BLD AUTO: 44.3 % (ref 42–52)
HGB BLD-MCNC: 14.8 G/DL (ref 14–18)
IMM GRANULOCYTES # BLD AUTO: 0.04 K/UL (ref 0–0.11)
IMM GRANULOCYTES NFR BLD AUTO: 0.4 % (ref 0–0.9)
INT CON NEG: NORMAL
INT CON POS: NORMAL
LACTATE BLD-SCNC: 1.3 MMOL/L (ref 0.5–2)
LIPASE SERPL-CCNC: 14 U/L (ref 7–58)
LYMPHOCYTES # BLD AUTO: 0.84 K/UL (ref 1–4.8)
LYMPHOCYTES NFR BLD: 8.1 % (ref 22–41)
MCH RBC QN AUTO: 31.4 PG (ref 27–33)
MCHC RBC AUTO-ENTMCNC: 33.4 G/DL (ref 33.7–35.3)
MCV RBC AUTO: 94.1 FL (ref 81.4–97.8)
MONOCYTES # BLD AUTO: 1.53 K/UL (ref 0–0.85)
MONOCYTES NFR BLD AUTO: 14.8 % (ref 0–13.4)
NEUTROPHILS # BLD AUTO: 7.9 K/UL (ref 1.82–7.42)
NEUTROPHILS NFR BLD: 76.2 % (ref 44–72)
NRBC # BLD AUTO: 0 K/UL
NRBC BLD-RTO: 0 /100 WBC
NT-PROBNP SERPL IA-MCNC: 103 PG/ML (ref 0–125)
PLATELET # BLD AUTO: 263 K/UL (ref 164–446)
PMV BLD AUTO: 8.9 FL (ref 9–12.9)
POTASSIUM SERPL-SCNC: 3.8 MMOL/L (ref 3.6–5.5)
PROT SERPL-MCNC: 7.2 G/DL (ref 6–8.2)
RBC # BLD AUTO: 4.71 M/UL (ref 4.7–6.1)
RSV RNA SPEC QL NAA+PROBE: NEGATIVE
SARS-COV+SARS-COV-2 AG RESP QL IA.RAPID: NEGATIVE
SARS-COV-2 RNA RESP QL NAA+PROBE: NOTDETECTED
SODIUM SERPL-SCNC: 133 MMOL/L (ref 135–145)
SPECIMEN SOURCE: NORMAL
TROPONIN T SERPL-MCNC: 8 NG/L (ref 6–19)
WBC # BLD AUTO: 10.4 K/UL (ref 4.8–10.8)

## 2022-05-16 PROCEDURE — G0378 HOSPITAL OBSERVATION PER HR: HCPCS

## 2022-05-16 PROCEDURE — C9803 HOPD COVID-19 SPEC COLLECT: HCPCS | Performed by: EMERGENCY MEDICINE

## 2022-05-16 PROCEDURE — 700102 HCHG RX REV CODE 250 W/ 637 OVERRIDE(OP): Performed by: EMERGENCY MEDICINE

## 2022-05-16 PROCEDURE — 36415 COLL VENOUS BLD VENIPUNCTURE: CPT

## 2022-05-16 PROCEDURE — 87040 BLOOD CULTURE FOR BACTERIA: CPT

## 2022-05-16 PROCEDURE — 700117 HCHG RX CONTRAST REV CODE 255: Performed by: EMERGENCY MEDICINE

## 2022-05-16 PROCEDURE — 81240 F2 GENE: CPT

## 2022-05-16 PROCEDURE — 84484 ASSAY OF TROPONIN QUANT: CPT

## 2022-05-16 PROCEDURE — 71275 CT ANGIOGRAPHY CHEST: CPT | Mod: ME

## 2022-05-16 PROCEDURE — 85306 CLOT INHIBIT PROT S FREE: CPT

## 2022-05-16 PROCEDURE — 93005 ELECTROCARDIOGRAM TRACING: CPT | Performed by: EMERGENCY MEDICINE

## 2022-05-16 PROCEDURE — 71046 X-RAY EXAM CHEST 2 VIEWS: CPT

## 2022-05-16 PROCEDURE — A9270 NON-COVERED ITEM OR SERVICE: HCPCS | Performed by: EMERGENCY MEDICINE

## 2022-05-16 PROCEDURE — 99215 OFFICE O/P EST HI 40 MIN: CPT | Mod: CS | Performed by: PHYSICIAN ASSISTANT

## 2022-05-16 PROCEDURE — 83605 ASSAY OF LACTIC ACID: CPT

## 2022-05-16 PROCEDURE — 93005 ELECTROCARDIOGRAM TRACING: CPT

## 2022-05-16 PROCEDURE — 85300 ANTITHROMBIN III ACTIVITY: CPT

## 2022-05-16 PROCEDURE — 86147 CARDIOLIPIN ANTIBODY EA IG: CPT | Mod: 91

## 2022-05-16 PROCEDURE — 83690 ASSAY OF LIPASE: CPT

## 2022-05-16 PROCEDURE — U0003 INFECTIOUS AGENT DETECTION BY NUCLEIC ACID (DNA OR RNA); SEVERE ACUTE RESPIRATORY SYNDROME CORONAVIRUS 2 (SARS-COV-2) (CORONAVIRUS DISEASE [COVID-19]), AMPLIFIED PROBE TECHNIQUE, MAKING USE OF HIGH THROUGHPUT TECHNOLOGIES AS DESCRIBED BY CMS-2020-01-R: HCPCS

## 2022-05-16 PROCEDURE — 83880 ASSAY OF NATRIURETIC PEPTIDE: CPT

## 2022-05-16 PROCEDURE — U0005 INFEC AGEN DETEC AMPLI PROBE: HCPCS

## 2022-05-16 PROCEDURE — 80053 COMPREHEN METABOLIC PANEL: CPT

## 2022-05-16 PROCEDURE — 87426 SARSCOV CORONAVIRUS AG IA: CPT | Performed by: PHYSICIAN ASSISTANT

## 2022-05-16 PROCEDURE — 0241U HCHG SARS-COV-2 COVID-19 NFCT DS RESP RNA 4 TRGT MIC: CPT

## 2022-05-16 PROCEDURE — 99220 PR INITIAL OBSERVATION CARE,LEVL III: CPT | Performed by: HOSPITALIST

## 2022-05-16 PROCEDURE — 85025 COMPLETE CBC W/AUTO DIFF WBC: CPT

## 2022-05-16 PROCEDURE — 85303 CLOT INHIBIT PROT C ACTIVITY: CPT

## 2022-05-16 PROCEDURE — 99291 CRITICAL CARE FIRST HOUR: CPT

## 2022-05-16 PROCEDURE — 87804 INFLUENZA ASSAY W/OPTIC: CPT | Performed by: PHYSICIAN ASSISTANT

## 2022-05-16 PROCEDURE — 85379 FIBRIN DEGRADATION QUANT: CPT

## 2022-05-16 PROCEDURE — 81241 F5 GENE: CPT

## 2022-05-16 RX ORDER — ATORVASTATIN CALCIUM 40 MG/1
40 TABLET, FILM COATED ORAL DAILY
Status: DISCONTINUED | OUTPATIENT
Start: 2022-05-17 | End: 2022-05-17 | Stop reason: HOSPADM

## 2022-05-16 RX ORDER — ACETAMINOPHEN 325 MG/1
650 TABLET ORAL EVERY 6 HOURS PRN
Status: DISCONTINUED | OUTPATIENT
Start: 2022-05-16 | End: 2022-05-17 | Stop reason: HOSPADM

## 2022-05-16 RX ORDER — POLYETHYLENE GLYCOL 3350 17 G/17G
1 POWDER, FOR SOLUTION ORAL
Status: DISCONTINUED | OUTPATIENT
Start: 2022-05-16 | End: 2022-05-17 | Stop reason: HOSPADM

## 2022-05-16 RX ORDER — BISACODYL 10 MG
10 SUPPOSITORY, RECTAL RECTAL
Status: DISCONTINUED | OUTPATIENT
Start: 2022-05-16 | End: 2022-05-17 | Stop reason: HOSPADM

## 2022-05-16 RX ORDER — AMOXICILLIN 250 MG
2 CAPSULE ORAL 2 TIMES DAILY
Status: DISCONTINUED | OUTPATIENT
Start: 2022-05-16 | End: 2022-05-17 | Stop reason: HOSPADM

## 2022-05-16 RX ADMIN — APIXABAN 10 MG: 5 TABLET, FILM COATED ORAL at 18:45

## 2022-05-16 RX ADMIN — IOHEXOL 70 ML: 350 INJECTION, SOLUTION INTRAVENOUS at 18:30

## 2022-05-16 ASSESSMENT — ENCOUNTER SYMPTOMS
WHEEZING: 0
NERVOUS/ANXIOUS: 0
EYE REDNESS: 0
BRUISES/BLEEDS EASILY: 0
CHILLS: 0
ABDOMINAL PAIN: 0
MYALGIAS: 0
ABDOMINAL PAIN: 0
STRIDOR: 0
FEVER: 0
FEVER: 0
HEADACHES: 0
COUGH: 0
VOMITING: 0
RHINORRHEA: 0
SHORTNESS OF BREATH: 1
FOCAL WEAKNESS: 0
EYE DISCHARGE: 0
SHORTNESS OF BREATH: 1
VOMITING: 0
FLANK PAIN: 0

## 2022-05-16 ASSESSMENT — LIFESTYLE VARIABLES
HOW MANY TIMES IN THE PAST YEAR HAVE YOU HAD 5 OR MORE DRINKS IN A DAY: 5
TOTAL SCORE: 0
EVER HAD A DRINK FIRST THING IN THE MORNING TO STEADY YOUR NERVES TO GET RID OF A HANGOVER: NO
HAVE PEOPLE ANNOYED YOU BY CRITICIZING YOUR DRINKING: NO
TOTAL SCORE: 0
EVER FELT BAD OR GUILTY ABOUT YOUR DRINKING: NO
HAVE YOU EVER FELT YOU SHOULD CUT DOWN ON YOUR DRINKING: NO
ON A TYPICAL DAY WHEN YOU DRINK ALCOHOL HOW MANY DRINKS DO YOU HAVE: 2
ALCOHOL_USE: YES
AVERAGE NUMBER OF DAYS PER WEEK YOU HAVE A DRINK CONTAINING ALCOHOL: 3
CONSUMPTION TOTAL: POSITIVE
TOTAL SCORE: 0

## 2022-05-16 ASSESSMENT — COGNITIVE AND FUNCTIONAL STATUS - GENERAL
MOBILITY SCORE: 24
DAILY ACTIVITIY SCORE: 24
SUGGESTED CMS G CODE MODIFIER DAILY ACTIVITY: CH
SUGGESTED CMS G CODE MODIFIER MOBILITY: CH

## 2022-05-16 ASSESSMENT — PATIENT HEALTH QUESTIONNAIRE - PHQ9
1. LITTLE INTEREST OR PLEASURE IN DOING THINGS: NOT AT ALL
2. FEELING DOWN, DEPRESSED, IRRITABLE, OR HOPELESS: NOT AT ALL
SUM OF ALL RESPONSES TO PHQ9 QUESTIONS 1 AND 2: 0

## 2022-05-16 ASSESSMENT — FIBROSIS 4 INDEX
FIB4 SCORE: 1.07
FIB4 SCORE: 1.07

## 2022-05-16 ASSESSMENT — PAIN DESCRIPTION - PAIN TYPE: TYPE: ACUTE PAIN

## 2022-05-16 NOTE — ED PROVIDER NOTES
"CHIEF COMPLAINT  Chief Complaint   Patient presents with   • Shortness of Breath     X 24 hours, started while hiking, difficulty taking deep breath   • Diarrhea     X 1 today   • Chest Pain     Right side, started \"about 24 hours ago\", described as \"my lung doesn't have room to expand to breathe,\" worse w/ yawning, bending over and trying to take a deep breath       HPI  Amando Pleitez is a 71 y.o. male who presents with shortness of breath over the last 24 hours.  He noted it [place] [facility] [place]  He states he has not had any recent cough or fever.  He notes it is worse when he lies flat.  No history of any lung or heart disease that he is aware of.  He does not smoke and has no history of asthma.  He does note some right-sided chest pain that is worse with breathing.  It is nonexertional.  No nausea vomiting, but some diarrhea.  No history of DVT or PE and no recent travel surgery or leg swelling.  Patient was seen in urgent care and referred here for further evaluation.    REVIEW OF SYSTEMS  All other systems are negative.     PAST MEDICAL HISTORY  Past Medical History:   Diagnosis Date   • Hypercholesteremia    • Vitamin D deficiency        FAMILY HISTORY  History reviewed. No pertinent family history.    SOCIAL HISTORY  Social History     Socioeconomic History   • Marital status:    Tobacco Use   • Smoking status: Never Smoker   • Smokeless tobacco: Never Used   Vaping Use   • Vaping Use: Never used   Substance and Sexual Activity   • Alcohol use: Not Currently   • Drug use: Never   • Sexual activity: Not Currently       SURGICAL HISTORY  History reviewed. No pertinent surgical history.    CURRENT MEDICATIONS  Home Medications    **Home medications have not yet been reviewed for this encounter**         ALLERGIES  No Known Allergies    PHYSICAL EXAM  VITAL SIGNS: BP (!) 150/87   Pulse 68   Temp 36.3 °C (97.4 °F) (Temporal)   Resp 18   Ht 1.829 m (6')   Wt 95.5 kg (210 " lb 8.6 oz)   SpO2 94%   BMI 28.55 kg/m²      Constitutional: Well developed, Well nourished, No acute distress, Non-toxic appearance.   HENT: Normocephalic, Atraumatic, TMs normal, mucous membranes moist, no erythema, exudates, swelling, or masses, nares patent  Eyes: nonicteric  Neck: Supple, no meningismus  Lymphatic: No lymphadenopathy noted.   Cardiovascular: Regular rate and rhythm, no gallops rubs or murmurs  Lungs: Clear bilaterally   Abdomen: Bowel sounds normal, Soft, No tenderness, No pulsatile masses.   Skin: Warm, Dry, no rash  Back: No tenderness, No CVA tenderness.   Genitalia: Deferred  Rectal: Deferred  Extremities: No edema  Neurologic: Alert, appropriate, follows commands, moving all extremities, normal speech   Psychiatric: Affect normal    EKG  Time is 1419, rate 70, sinus rhythm, no ST segment elevation or depression or T wave change    RADIOLOGY/PROCEDURES  CT-CTA CHEST PULMONARY ARTERY W/ RECONS   Final Result      1.  Positive for bilateral pulmonary emboli.      2.  Right middle lobe consolidation consistent with pulmonary infarct with bilateral lower lobe atelectasis versus infarct.      3.  Small right pleural effusion.      4.  This was discussed with Dr. Greco at 6:18 PM.              Results for orders placed or performed during the hospital encounter of 05/16/22   CBC with Differential   Result Value Ref Range    WBC 10.4 4.8 - 10.8 K/uL    RBC 4.71 4.70 - 6.10 M/uL    Hemoglobin 14.8 14.0 - 18.0 g/dL    Hematocrit 44.3 42.0 - 52.0 %    MCV 94.1 81.4 - 97.8 fL    MCH 31.4 27.0 - 33.0 pg    MCHC 33.4 (L) 33.7 - 35.3 g/dL    RDW 43.6 35.9 - 50.0 fL    Platelet Count 263 164 - 446 K/uL    MPV 8.9 (L) 9.0 - 12.9 fL    Neutrophils-Polys 76.20 (H) 44.00 - 72.00 %    Lymphocytes 8.10 (L) 22.00 - 41.00 %    Monocytes 14.80 (H) 0.00 - 13.40 %    Eosinophils 0.20 0.00 - 6.90 %    Basophils 0.30 0.00 - 1.80 %    Immature Granulocytes 0.40 0.00 - 0.90 %    Nucleated RBC 0.00 /100 WBC     Neutrophils (Absolute) 7.90 (H) 1.82 - 7.42 K/uL    Lymphs (Absolute) 0.84 (L) 1.00 - 4.80 K/uL    Monos (Absolute) 1.53 (H) 0.00 - 0.85 K/uL    Eos (Absolute) 0.02 0.00 - 0.51 K/uL    Baso (Absolute) 0.03 0.00 - 0.12 K/uL    Immature Granulocytes (abs) 0.04 0.00 - 0.11 K/uL    NRBC (Absolute) 0.00 K/uL   Complete Metabolic Panel (CMP)   Result Value Ref Range    Sodium 133 (L) 135 - 145 mmol/L    Potassium 3.8 3.6 - 5.5 mmol/L    Chloride 98 96 - 112 mmol/L    Co2 23 20 - 33 mmol/L    Anion Gap 12.0 7.0 - 16.0    Glucose 104 (H) 65 - 99 mg/dL    Bun 13 8 - 22 mg/dL    Creatinine 0.73 0.50 - 1.40 mg/dL    Calcium 9.2 8.4 - 10.2 mg/dL    AST(SGOT) 13 12 - 45 U/L    ALT(SGPT) 21 2 - 50 U/L    Alkaline Phosphatase 114 (H) 30 - 99 U/L    Total Bilirubin 2.5 (H) 0.1 - 1.5 mg/dL    Albumin 4.0 3.2 - 4.9 g/dL    Total Protein 7.2 6.0 - 8.2 g/dL    Globulin 3.2 1.9 - 3.5 g/dL    A-G Ratio 1.3 g/dL   Troponin   Result Value Ref Range    Troponin T 8 6 - 19 ng/L   LIPASE   Result Value Ref Range    Lipase 14 7 - 58 U/L   CoV-2, FLU A/B, and RSV by PCR (2-4 Hours CEPHEID) : Collect NP swab in VTM    Specimen: Nasopharyngeal; Respirate   Result Value Ref Range    Influenza virus A RNA Negative Negative    Influenza virus B, PCR Negative Negative    RSV, PCR Negative Negative    SARS-CoV-2 by PCR NotDetected     SARS-CoV-2 Source NP Swab    ESTIMATED GFR   Result Value Ref Range    GFR (CKD-EPI) 98 >60 mL/min/1.73 m 2   proBrain Natriuretic Peptide, NT   Result Value Ref Range    NT-proBNP 103 0 - 125 pg/mL   D-DIMER   Result Value Ref Range    D-Dimer Screen 0.87 (H) 0.00 - 0.50 ug/mL (FEU)   LACTIC ACID   Result Value Ref Range    Lactic Acid 1.3 0.5 - 2.0 mmol/L   EKG   Result Value Ref Range    Report       [facility] of [facility]    Test Date:  2022-05-16  Pt Name:    BAO BAUTISTA                    Department: [facility]  MRN:        [id number]                      Room:  Gender:     Male                          Technician: PAULIE  :        1950                   Requested By:ER TRIAGE PROTOCOL  Order #:    [id number]                    Reading MD:    Measurements  Intervals                                Axis  Rate:       70                           P:          64  CO:         185                          QRS:        66  QRSD:       104                          T:          32  QT:         400  QTc:        432    Interpretive Statements  Sinus rhythm  Abnormal R-wave progression, early transition  No previous ECG available for comparison           COURSE & MEDICAL DECISION MAKING  Pertinent Labs & Imaging studies reviewed. (See chart for details)  This is a 71-year-old male who presents with 24 hours of shortness of breath.  His x-ray demonstrated what could be pneumonia although the patient had a normal white count normal lactate never had any fever or cough.  The patient on further work-up has evidence of bilateral PEs with multiple pulmonary infarcts.  His oxygen is in the low 90s at rest but when he walks through the department his oxygen drops to 86% room air.  Otherwise his pes he is 79 which is low risk.  However given the multiple infarcts and the hypoxia with ambulation, I am going to admit him on anticoagulation.  He has been written for Eliquis 10 mg.  At this time there is no evidence of right heart strain.    FINAL IMPRESSION  1.  Bilateral pulmonary emboli  2.  Pulmonary infarcts  3.  Hypoxia         Electronically signed by: Trey Greco M.D., 2022 4:29 PM

## 2022-05-16 NOTE — TELEPHONE ENCOUNTER
Phone Number Called: [phone]    Call outcome: Spoke to patient regarding message below.    Message: Wife of pt called stating that they had went on a hike and pt has been short of breath and would like an appt with PCP I stated pt should report to ER or UC immediatly

## 2022-05-16 NOTE — ED TRIAGE NOTES
"Chief Complaint   Patient presents with   • Shortness of Breath     X 24 hours, started while hiking, difficulty taking deep breath   • Diarrhea     X 1 today   • Chest Pain     Right side, started \"about 24 hours ago\", described as \"my lung doesn't have room to expand to breathe,\" worse w/ yawning, bending over and trying to take a deep breath     BP (!) 150/87   Pulse 68   Temp 36.3 °C (97.4 °F) (Temporal)   Resp 18   Ht 1.829 m (6')   Wt 95.5 kg (210 lb 8.6 oz)   SpO2 94%   BMI 28.55 kg/m²     Pt ambulated to ED w/ visitor for c/o feeling short of breath and Right side CP x 24 hours.  Pt states was hiking when started having difficulty breathing, states feels \"has fluid building up in my lung.\"  Denies hx of same.    "

## 2022-05-16 NOTE — PROGRESS NOTES
Subjective     Amando Pleitez is a 71 y.o. male who presents with Shortness of Breath (SOB, fatigue, pain in lungs, can't take a deep breath since yesterday) and Diarrhea (Had diarrhea last night)    Medications:    • atorvastatin Tabs  • Vitamin D3 Caps    Allergies: Patient has no known allergies.    Problem List: Amando Pleitez does not have any pertinent problems on file.    Surgical History:  No past surgical history on file.    Past Social Hx: Amando Pleitez  reports that he has never smoked. He has never used smokeless tobacco. He reports previous alcohol use. He reports that he does not use drugs.     Past Family Hx:  Amando Pleitez family history is not on file.     Problem list, medications, and allergies reviewed by myself today in Epic.          Patient presents with:  Shortness of Breath: SOB, fatigue, pain in lungs, can't take a deep breath since yesterday  Diarrhea: Had diarrhea last night    Pt presents clinic today with complaint of shortness of breath, right-sided lung pain with deep inspiration.  And diarrhea since last night.  Patient also reports that he worked yesterday, and was feeling a little low in energy but not ill.  Patient went to lunch after and then went home and took a nap which is very unusual for him.  Patient then went on a walk/hike with his wife though he states it was rather flat but was having a difficult time keeping up due to shortness of breath.  Patient denies fever, chills, nausea, vomiting, headache, chest pain, back pain.  Patient states pain is limited with inspiration and to the right anterior chest.  Patient reports that pain is not terrible if he breathes shallowly, but gets significantly worse with any deep breath.  Patient has not taken any over-the-counter medications for his symptoms.  Patient has been vaccinated against COVID-19, most recent vaccine was 2 weeks ago.  Pt denies hx of PE, DVT, CHF, MI , LE edema.  No other complaint.     Shortness  of Breath  This is a new problem. The current episode started yesterday. The problem occurs constantly. The problem has been gradually worsening. Pertinent negatives include no abdominal pain, chest pain, fever, headaches, hemoptysis, leg pain, leg swelling, rhinorrhea, sputum production, vomiting or wheezing. The symptoms are aggravated by lying flat and any activity (deep inspiration). The patient has no known risk factors for DVT/PE. He has tried rest for the symptoms. The treatment provided no relief. There is no history of asthma, CAD, COPD, DVT, a heart failure, PE or pneumonia.       Review of Systems   Constitutional: Positive for malaise/fatigue. Negative for fever.   HENT: Negative for rhinorrhea.    Respiratory: Positive for shortness of breath. Negative for cough, hemoptysis, sputum production and wheezing.    Cardiovascular: Negative for chest pain and leg swelling.   Gastrointestinal: Negative for abdominal pain and vomiting.   Neurological: Negative for headaches.   All other systems reviewed and are negative.             Objective     BP (!) 140/76 (BP Location: Left arm, Patient Position: Sitting)   Pulse 66   Temp 36.7 °C (98 °F)   Resp 14   Ht 1.829 m (6')   Wt 95.5 kg (210 lb 9.6 oz)   SpO2 96%   BMI 28.56 kg/m²      Physical Exam  Vitals and nursing note reviewed.   Constitutional:       General: He is not in acute distress.     Appearance: Normal appearance. He is well-developed and normal weight. He is not ill-appearing or toxic-appearing.   HENT:      Head: Normocephalic and atraumatic.      Right Ear: Tympanic membrane normal.      Left Ear: Tympanic membrane normal.      Nose: Nose normal.      Mouth/Throat:      Lips: Pink.      Mouth: Mucous membranes are moist.      Pharynx: Oropharynx is clear. Uvula midline.   Eyes:      Extraocular Movements: Extraocular movements intact.      Conjunctiva/sclera: Conjunctivae normal.      Pupils: Pupils are equal, round, and reactive to light.    Cardiovascular:      Rate and Rhythm: Normal rate and regular rhythm.      Pulses: Normal pulses.      Heart sounds: Normal heart sounds.   Pulmonary:      Effort: Pulmonary effort is normal. No respiratory distress.      Breath sounds: Normal breath sounds. Decreased air movement present. No stridor. No wheezing, rhonchi or rales.   Chest:      Chest wall: No tenderness.   Abdominal:      General: Bowel sounds are normal.      Palpations: Abdomen is soft.   Musculoskeletal:         General: Normal range of motion.      Cervical back: Normal range of motion and neck supple.   Skin:     General: Skin is warm and dry.      Capillary Refill: Capillary refill takes less than 2 seconds.   Neurological:      General: No focal deficit present.      Mental Status: He is alert and oriented to person, place, and time.      Cranial Nerves: No cranial nerve deficit.      Motor: Motor function is intact.      Coordination: Coordination is intact.      Gait: Gait normal.   Psychiatric:         Mood and Affect: Mood normal.                             Lab Results/POC Test Results   Results for orders placed or performed during the hospital encounter of 05/16/22   SARS-CoV-2, PCR (In-House)   Result Value Ref Range    SARS-CoV-2 Source Nasal Swab     SARS-CoV-2 by PCR NotDetected    COVID/SARS CoV-2 PCR   Result Value Ref Range    COVID Order Status Received                RADIOLOGY RESULTS   DX-CHEST-2 VIEWS    Result Date: 5/16/2022 5/16/2022 12:58 PM HISTORY/REASON FOR EXAM:  Shortness of Breath TECHNIQUE/EXAM DESCRIPTION AND NUMBER OF VIEWS: Two views of the chest. COMPARISON:  None available. FINDINGS: The mediastinal and cardiac silhouette is unremarkable. There is atherosclerosis of the aorta. There are ill-defined linear opacities in both lower lung zones, right greater than left. There is mild blunting of both costophrenic angles. There is no visible pneumothorax. There are no acute bony abnormalities.     1.  There is  ill-defined bibasilar opacity, right greater than left which could be due to atelectasis, scarring or possibly pneumonitis particularly in the right lower lobe. 2.  Blunting of the costophrenic angles could be due to chronic pleural thickening or minimal amounts of pleural effusion.         Assessment & Plan                  1. Shortness of breath  COVID/SARS CoV-2 PCR    DX-CHEST-2 VIEWS    POCT SARS-COV Antigen VICKEY (Symptomatic only)    POCT Influenza A/B   2. Bilateral pleural effusion     3. Fatigue, unspecified type  COVID/SARS CoV-2 PCR    DX-CHEST-2 VIEWS    POCT SARS-COV Antigen VICKEY (Symptomatic only)    POCT Influenza A/B   4. Cough  COVID/SARS CoV-2 PCR    DX-CHEST-2 VIEWS    POCT SARS-COV Antigen VICKEY (Symptomatic only)    POCT Influenza A/B   5. Diarrhea, unspecified type         Patient was evaluated in clinic today while wearing appropriate personal protective equipment.      Discussed EKG, pt declined at this time, he feels this is respiratory.     PT requires evaluation and treatment at a facility that can provide a higher level of care due to acuity of illness/complaint.  Ddx: PE, ACS, NSTEMI, STEMI, COVID PNA,     I spoke with Dr Smith regarding patient, agrees pt needs to be seen in ER for further evaluation.  Notified [facility] of pt pending arrival to ER.     PT requires evaluation and treatment at a facility that can provide a higher level of care due to acuity of illness/complaint.     Pt will go to [facility] ER by POV for further evaluation.

## 2022-05-17 ENCOUNTER — APPOINTMENT (OUTPATIENT)
Dept: CARDIOLOGY | Facility: MEDICAL CENTER | Age: 72
End: 2022-05-17
Attending: HOSPITALIST
Payer: MEDICARE

## 2022-05-17 VITALS
BODY MASS INDEX: 28.52 KG/M2 | HEART RATE: 76 BPM | WEIGHT: 210.54 LBS | TEMPERATURE: 97.8 F | HEIGHT: 72 IN | OXYGEN SATURATION: 95 % | RESPIRATION RATE: 18 BRPM | SYSTOLIC BLOOD PRESSURE: 123 MMHG | DIASTOLIC BLOOD PRESSURE: 80 MMHG

## 2022-05-17 LAB
ALBUMIN SERPL BCP-MCNC: 3.6 G/DL (ref 3.2–4.9)
ALBUMIN/GLOB SERPL: 1.2 G/DL
ALP SERPL-CCNC: 99 U/L (ref 30–99)
ALT SERPL-CCNC: 20 U/L (ref 2–50)
ANION GAP SERPL CALC-SCNC: 11 MMOL/L (ref 7–16)
AST SERPL-CCNC: 16 U/L (ref 12–45)
BILIRUB SERPL-MCNC: 2.2 MG/DL (ref 0.1–1.5)
BUN SERPL-MCNC: 12 MG/DL (ref 8–22)
CALCIUM SERPL-MCNC: 9.1 MG/DL (ref 8.4–10.2)
CHLORIDE SERPL-SCNC: 100 MMOL/L (ref 96–112)
CO2 SERPL-SCNC: 24 MMOL/L (ref 20–33)
COVID ORDER STATUS COVID19: NORMAL
CREAT SERPL-MCNC: 0.61 MG/DL (ref 0.5–1.4)
ERYTHROCYTE [DISTWIDTH] IN BLOOD BY AUTOMATED COUNT: 43.5 FL (ref 35.9–50)
GFR SERPLBLD CREATININE-BSD FMLA CKD-EPI: 104 ML/MIN/1.73 M 2
GLOBULIN SER CALC-MCNC: 3.1 G/DL (ref 1.9–3.5)
GLUCOSE SERPL-MCNC: 115 MG/DL (ref 65–99)
HCT VFR BLD AUTO: 42.9 % (ref 42–52)
HGB BLD-MCNC: 13.9 G/DL (ref 14–18)
LV EJECT FRACT  99904: 65
LV EJECT FRACT MOD 2C 99903: 58.3
LV EJECT FRACT MOD 4C 99902: 72.09
LV EJECT FRACT MOD BP 99901: 66.8
MAGNESIUM SERPL-MCNC: 2 MG/DL (ref 1.5–2.5)
MCH RBC QN AUTO: 30.8 PG (ref 27–33)
MCHC RBC AUTO-ENTMCNC: 32.4 G/DL (ref 33.7–35.3)
MCV RBC AUTO: 94.9 FL (ref 81.4–97.8)
PLATELET # BLD AUTO: 239 K/UL (ref 164–446)
PMV BLD AUTO: 9.4 FL (ref 9–12.9)
POTASSIUM SERPL-SCNC: 3.9 MMOL/L (ref 3.6–5.5)
PROT SERPL-MCNC: 6.7 G/DL (ref 6–8.2)
RBC # BLD AUTO: 4.52 M/UL (ref 4.7–6.1)
SARS-COV-2 RNA RESP QL NAA+PROBE: NOTDETECTED
SODIUM SERPL-SCNC: 135 MMOL/L (ref 135–145)
SPECIMEN SOURCE: NORMAL
WBC # BLD AUTO: 8.3 K/UL (ref 4.8–10.8)

## 2022-05-17 PROCEDURE — 99217 PR OBSERVATION CARE DISCHARGE: CPT | Performed by: INTERNAL MEDICINE

## 2022-05-17 PROCEDURE — G0378 HOSPITAL OBSERVATION PER HR: HCPCS

## 2022-05-17 PROCEDURE — 93306 TTE W/DOPPLER COMPLETE: CPT

## 2022-05-17 PROCEDURE — 85027 COMPLETE CBC AUTOMATED: CPT

## 2022-05-17 PROCEDURE — 83735 ASSAY OF MAGNESIUM: CPT

## 2022-05-17 PROCEDURE — 700102 HCHG RX REV CODE 250 W/ 637 OVERRIDE(OP): Performed by: HOSPITALIST

## 2022-05-17 PROCEDURE — A9270 NON-COVERED ITEM OR SERVICE: HCPCS | Performed by: HOSPITALIST

## 2022-05-17 PROCEDURE — 93306 TTE W/DOPPLER COMPLETE: CPT | Mod: 26 | Performed by: INTERNAL MEDICINE

## 2022-05-17 PROCEDURE — 80053 COMPREHEN METABOLIC PANEL: CPT

## 2022-05-17 RX ADMIN — ATORVASTATIN CALCIUM 40 MG: 40 TABLET, FILM COATED ORAL at 05:39

## 2022-05-17 RX ADMIN — APIXABAN 10 MG: 5 TABLET, FILM COATED ORAL at 05:39

## 2022-05-17 RX ADMIN — ACETAMINOPHEN 650 MG: 325 TABLET, FILM COATED ORAL at 08:24

## 2022-05-17 ASSESSMENT — COGNITIVE AND FUNCTIONAL STATUS - GENERAL
SUGGESTED CMS G CODE MODIFIER DAILY ACTIVITY: CH
DAILY ACTIVITIY SCORE: 24
MOBILITY SCORE: 24
SUGGESTED CMS G CODE MODIFIER MOBILITY: CH

## 2022-05-17 ASSESSMENT — PAIN DESCRIPTION - PAIN TYPE
TYPE: ACUTE PAIN
TYPE: ACUTE PAIN

## 2022-05-17 NOTE — H&P
"Hospital Medicine History & Physical Note    Date of Service  5/16/2022    Primary Care Physician  Fox Pandya P.A.-C.    Consultants  None     Code Status  Full Code    Chief Complaint  Chief Complaint   Patient presents with   • Shortness of Breath     X 24 hours, started while hiking, difficulty taking deep breath   • Diarrhea     X 1 today   • Chest Pain     Right side, started \"about 24 hours ago\", described as \"my lung doesn't have room to expand to breathe,\" worse w/ yawning, bending over and trying to take a deep breath     History of Presenting Illness  Amando Pleitez is a 71 y.o. male with a past medical history of hyperlipidemia who presented 5/16/2022 with shortness of breath and chest pain.  Patient reports that he has not been feeling well since yesterday.  He reports that he has noticed exertional shortness of breath while hiking.  He reports he never had that before.  Denies having orthopnea or paroxysmal nocturnal dyspnea.  Denies having fevers or chills.  Patient also has been noticing chest pain that is pleuritic sharp in nature more on the right side.  Worse with breathing.  No radiation to other places.  Patient denies noticing any lower extremity pain redness or swelling.     I discussed the plan of care with emergency department physician, and the patient.    Review of Systems  Review of Systems   Constitutional: Negative for chills and fever.   Eyes: Negative for discharge and redness.   Respiratory: Positive for shortness of breath. Negative for cough and stridor.    Cardiovascular: Positive for chest pain. Negative for leg swelling.   Gastrointestinal: Negative for abdominal pain and vomiting.   Genitourinary: Negative for flank pain.   Musculoskeletal: Negative for myalgias.   Skin: Negative.    Neurological: Negative for focal weakness.   Endo/Heme/Allergies: Does not bruise/bleed easily.   Psychiatric/Behavioral: The patient is not nervous/anxious.      Past Medical History   " has a past medical history of Hypercholesteremia and Vitamin D deficiency.    Surgical History  No prior surgeries    Family History  No personal history of hypercoagulability disorder    Social History   reports that he has never smoked. He has never used smokeless tobacco. He reports previous alcohol use. He reports that he does not use drugs.    Allergies  No Known Allergies    Medications  Prior to Admission Medications   Prescriptions Last Dose Informant Patient Reported? Taking?   Cholecalciferol (VITAMIN D3) 1.25 MG (46661 UT) Cap   No No   Sig: TAKE 1 CAPSULE BY MOUTH ONE TIME PER WEEK   atorvastatin (LIPITOR) 40 MG Tab   No No   Sig: TAKE 1 TABLET BY MOUTH EVERY DAY      Facility-Administered Medications: None     Physical Exam  Temp:  [36.3 °C (97.4 °F)-36.7 °C (98 °F)] 36.3 °C (97.4 °F)  Pulse:  [66-90] 75  Resp:  [14-27] 27  BP: (125-150)/(69-87) 134/80  SpO2:  [88 %-96 %] 94 %  Blood Pressure : (!) 143/84   Temperature: 36.3 °C (97.4 °F)   Pulse: 74   Respiration: (!) 22   Pulse Oximetry: 93 %     Physical Exam  Constitutional:       General: He is not in acute distress.     Appearance: He is not ill-appearing or diaphoretic.   HENT:      Head: Atraumatic.      Right Ear: External ear normal.      Left Ear: External ear normal.      Nose: No congestion or rhinorrhea.      Mouth/Throat:      Mouth: Mucous membranes are moist.   Eyes:      General: No scleral icterus.        Right eye: No discharge.         Left eye: No discharge.      Pupils: Pupils are equal, round, and reactive to light.   Cardiovascular:      Rate and Rhythm: Normal rate and regular rhythm.   Pulmonary:      Effort: Pulmonary effort is normal.      Comments: Tachypnea  Saturating well on room air at rest, requires 1-2 L of oxygen with ambulation.  Abdominal:      General: There is no distension.   Musculoskeletal:      Cervical back: Neck supple. No rigidity. No muscular tenderness.      Right lower leg: No edema.      Left lower leg:  No edema.   Skin:     Coloration: Skin is not jaundiced or pale.   Neurological:      Mental Status: He is alert and oriented to person, place, and time.      Coordination: Coordination normal.   Psychiatric:         Mood and Affect: Mood normal.         Behavior: Behavior normal.       Laboratory:  Recent Labs     05/16/22  1510   WBC 10.4   RBC 4.71   HEMOGLOBIN 14.8   HEMATOCRIT 44.3   MCV 94.1   MCH 31.4   MCHC 33.4*   RDW 43.6   PLATELETCT 263   MPV 8.9*     Recent Labs     05/16/22  1510   SODIUM 133*   POTASSIUM 3.8   CHLORIDE 98   CO2 23   GLUCOSE 104*   BUN 13   CREATININE 0.73   CALCIUM 9.2     Recent Labs     05/16/22  1510   ALTSGPT 21   ASTSGOT 13   ALKPHOSPHAT 114*   TBILIRUBIN 2.5*   LIPASE 14   GLUCOSE 104*         Recent Labs     05/16/22  1510   NTPROBNP 103         Recent Labs     05/16/22  1510   TROPONINT 8       Imaging:  CT-CTA CHEST PULMONARY ARTERY W/ RECONS   Final Result      1.  Positive for bilateral pulmonary emboli.      2.  Right middle lobe consolidation consistent with pulmonary infarct with bilateral lower lobe atelectasis versus infarct.      3.  Small right pleural effusion.      4.  This was discussed with Dr. Greco at 6:18 PM.            EC-ECHOCARDIOGRAM COMPLETE W/O CONT    (Results Pending)     Assessment/Plan:  Justification for Admission Status  I anticipate this patient is appropriate for observation status at this time because Likely discharge after 1 midnight    Acute pulmonary embolism (HCC)- (present on admission)  Assessment & Plan  We will check echocardiography to rule out cardiac strain and assess whether or not the patient would be a candidate for an EKOS procedure.  Patient is a recurrent platelet donor.  No prior history of clots, will check for hypercoagulable disorders  Continue anticoagulation  Oxygen as needed, Respiratory protocol, Incentive spirometry    Shortness of breath- (present on admission)  Assessment & Plan  Secondary to pulmonary embolism.   Therapeutic anticoagulation  Oxygen as needed, Respiratory protocol, Bronchodilators, Incentive spirometry     Mixed hyperlipidemia  Assessment & Plan  Resume home atorvastatin    VTE prophylaxis: SCDs/TEDs and therapeutic anticoagulation with Apixaban

## 2022-05-17 NOTE — PROGRESS NOTES
Pt arrive to unit via gurney. Report received from Daisy NORTH. Pt awake, alert, verbally responsive. Oriented pt to unit, room and call light and use of call light to call staff for assistance. Pt verbalized understanding. C/o right chest pain when breathing in, declines pain medications. Remains on 2L NC O2 sat 95%. Skin assessment completed by 2 RNs, Rosalee RN and Rob RN.

## 2022-05-17 NOTE — DISCHARGE INSTRUCTIONS
Discharge Instructions    Discharged to home by car with relative. Discharged via walking, hospital escort: Yes.  Special equipment needed: Not Applicable    Be sure to schedule a follow-up appointment with your primary care doctor or any specialists as instructed.     Discharge Plan:   Diet Plan: Discussed  Activity Level: Discussed  Confirmed Follow up Appointment: Patient to Call and Schedule Appointment  Confirmed Symptoms Management: Discussed  Medication Reconciliation Updated: Yes    I understand that a diet low in cholesterol, fat, and sodium is recommended for good health. Unless I have been given specific instructions below for another diet, I accept this instruction as my diet prescription.       Special Instructions: None    Is patient discharged on Warfarin / Coumadin?   No     Depression / Suicide Risk    As you are discharged from this [facility] facility, it is important to learn how to keep safe from harming yourself.    Recognize the warning signs:  Abrupt changes in personality, positive or negative- including increase in energy   Giving away possessions  Change in eating patterns- significant weight changes-  positive or negative  Change in sleeping patterns- unable to sleep or sleeping all the time   Unwillingness or inability to communicate  Depression  Unusual sadness, discouragement and loneliness  Talk of wanting to die  Neglect of personal appearance   Rebelliousness- reckless behavior  Withdrawal from people/activities they love  Confusion- inability to concentrate     If you or a loved one observes any of these behaviors or has concerns about self-harm, here's what you can do:  Talk about it- your feelings and reasons for harming yourself  Remove any means that you might use to hurt yourself (examples: pills, rope, extension cords, firearm)  Get professional help from the community (Mental Health, Substance Abuse, psychological counseling)  Do not be alone:Call your Safe Contact-  someone whom you trust who will be there for you.  Call your local CRISIS HOTLINE 218-1573 or 957-934-2632  Call your local Children's Mobile Crisis Response Team [place] (742) 666-6002 or www.[employer]  Call the toll free National Suicide Prevention Hotlines   National Suicide Prevention Lifeline 919-726-RJDI (5582)  [facility] Line Network 800-SUICIDE (408-9613)    Pulmonary Embolism    A pulmonary embolism (PE) is a sudden blockage or decrease of blood flow in one or both lungs. Most blockages come from a blood clot that forms in the vein of a lower leg, thigh, or arm (deep vein thrombosis, DVT) and travels to the lungs. A clot is blood that has thickened into a gel or solid. PE is a dangerous and life-threatening condition that needs to be treated right away.  What are the causes?  This condition is usually caused by a blood clot that forms in a vein and moves to the lungs. In rare cases, it may be caused by air, fat, part of a tumor, or other tissue that moves through the veins and into the lungs.  What increases the risk?  The following factors may make you more likely to develop this condition:  Experiencing a traumatic injury, such as breaking a hip or leg.  Having:  A spinal cord injury.  Orthopedic surgery, especially hip or knee replacement.  Any major surgery.  A stroke.  DVT.  Blood clots or blood clotting disease.  Long-term (chronic) lung or heart disease.  Cancer treated with chemotherapy.  A central venous catheter.  Taking medicines that contain estrogen. These include birth control pills and hormone replacement therapy.  Being:  Pregnant.  In the period of time after your baby is delivered (postpartum).  Older than age 60.  Overweight.  A smoker, especially if you have other risks.  What are the signs or symptoms?  Symptoms of this condition usually start suddenly and include:  Shortness of breath during activity or at rest.  Coughing, coughing up blood, or coughing up blood-tinged  mucus.  Chest pain that is often worse with deep breaths.  Rapid or irregular heartbeat.  Feeling light-headed or dizzy.  Fainting.  Feeling anxious.  Fever.  Sweating.  Pain and swelling in a leg. This is a symptom of DVT, which can lead to PE.  How is this diagnosed?  This condition may be diagnosed based on:  Your medical history.  A physical exam.  Blood tests.  CT pulmonary angiogram. This test checks blood flow in and around your lungs.  Ventilation-perfusion scan, also called a lung VQ scan. This test measures air flow and blood flow to the lungs.  An ultrasound of the legs.  How is this treated?  Treatment for this condition depends on many factors, such as the cause of your PE, your risk for bleeding or developing more clots, and other medical conditions you have. Treatment aims to remove, dissolve, or stop blood clots from forming or growing larger. Treatment may include:  Medicines, such as:  Blood thinning medicines (anticoagulants) to stop clots from forming.  Medicines that dissolve clots (thrombolytics).  Procedures, such as:  Using a flexible tube to remove a blood clot (embolectomy) or to deliver medicine to destroy it (catheter-directed thrombolysis).  Inserting a filter into a large vein that carries blood to the heart (inferior vena cava). This filter (vena cava filter) catches blood clots before they reach the lungs.  Surgery to remove the clot (surgical embolectomy). This is rare.  You may need a combination of immediate, long-term (up to 3 months after diagnosis), and extended (more than 3 months after diagnosis) treatments. Your treatment may continue for several months (maintenance therapy). You and your health care provider will work together to choose the treatment program that is best for you.  Follow these instructions at home:  Medicines  Take over-the-counter and prescription medicines only as told by your health care provider.  If you are taking an anticoagulant medicine:  Take the  medicine every day at the same time each day.  Understand what foods and drugs interact with your medicine.  Understand the side effects of this medicine, including excessive bruising or bleeding. Ask your health care provider or pharmacist about other side effects.  General instructions  Wear a medical alert bracelet or carry a medical alert card that says you have had a PE and lists what medicines you take.  Ask your health care provider when you may return to your normal activities. Avoid sitting or lying for a long time without moving.  Maintain a healthy weight. Ask your health care provider what weight is healthy for you.  Do not use any products that contain nicotine or tobacco, such as cigarettes, e-cigarettes, and chewing tobacco. If you need help quitting, ask your health care provider.  Talk with your health care provider about any travel plans. It is important to make sure that you are still able to take your medicine while on trips.  Keep all follow-up visits as told by your health care provider. This is important.  Contact a health care provider if:  You missed a dose of your blood thinner medicine.  Get help right away if:  You have:  New or increased pain, swelling, warmth, or redness in an arm or leg.  Numbness or tingling in an arm or leg.  Shortness of breath during activity or at rest.  A fever.  Chest pain.  A rapid or irregular heartbeat.  A severe headache.  Vision changes.  A serious fall or accident, or you hit your head.  Stomach (abdominal) pain.  Blood in your vomit, stool, or urine.  A cut that will not stop bleeding.  You cough up blood.  You feel light-headed or dizzy.  You cannot move your arms or legs.  You are confused or have memory loss.  These symptoms may represent a serious problem that is an emergency. Do not wait to see if the symptoms will go away. Get medical help right away. Call your local emergency services (911 in the [place] Do not drive yourself to the  [facility].  Summary  A pulmonary embolism (PE) is a sudden blockage or decrease of blood flow in one or both lungs. PE is a dangerous and life-threatening condition that needs to be treated right away.  Treatments for this condition usually include medicines to thin your blood (anticoagulants) or medicines to break apart blood clots (thrombolytics).  If you are given blood thinners, it is important to take the medicine every day at the same time each day.  Understand what foods and drugs interact with any medicines that you are taking.  If you have signs of PE or DVT, call your local emergency services (911 in the [place]  This information is not intended to replace advice given to you by your health care provider. Make sure you discuss any questions you have with your health care provider.  Document Released: 12/15/2001 Document Revised: 09/25/2019 Document Reviewed: 09/25/2019  Elsevier Patient Education © 2020 [employer]

## 2022-05-17 NOTE — ED NOTES
SPO2 noted to have decreased to 88% RA pt placed on 2L NC pt reports he feels a little winded but no respiratory distress noted.

## 2022-05-17 NOTE — ASSESSMENT & PLAN NOTE
Secondary to pulmonary embolism.  Therapeutic anticoagulation  Oxygen as needed, Respiratory protocol, Bronchodilators, Incentive spirometry

## 2022-05-17 NOTE — PROGRESS NOTES
Rhythm: NSR  Rate: 60-70's  Measurements: .20/.08/.40     Normal Values  Rhythm: Sinus  HR:   Measurements: 0.12-0.20/0.06-0.10/0.30-0.52

## 2022-05-17 NOTE — ASSESSMENT & PLAN NOTE
We will check echocardiography to rule out cardiac strain and assess whether or not the patient would be a candidate for an EKOS procedure.  Patient is a recurrent platelet donor.  No prior history of clots, will check for hypercoagulable disorders  Continue anticoagulation  Oxygen as needed, Respiratory protocol, Incentive spirometry

## 2022-05-17 NOTE — ED NOTES
Assumed care of pt   Sitting up In bed pt finished dinner tray wife at bedside  Pt aware of plan to admit & is in agreement no respiratory distress noted chest rise even pending room for admission

## 2022-05-17 NOTE — DISCHARGE SUMMARY
"Discharge Summary    CHIEF COMPLAINT ON ADMISSION  Chief Complaint   Patient presents with   • Shortness of Breath     X 24 hours, started while hiking, difficulty taking deep breath   • Diarrhea     X 1 today   • Chest Pain     Right side, started \"about 24 hours ago\", described as \"my lung doesn't have room to expand to breathe,\" worse w/ yawning, bending over and trying to take a deep breath       Reason for Admission  Chest Pains, Shortness of Breath     Admission Date  5/16/2022    CODE STATUS  Prior    HPI & HOSPITAL COURSE  Amando Pleitez is a 71 y.o. male with a past medical history of hyperlipidemia who presented 5/16/2022 with shortness of breath and chest pain.  He reports that he has noticed exertional shortness of breath while hiking.  Denies having orthopnea or paroxysmal nocturnal dyspnea.  Denies having fevers or chills.  Patient also has been noticing chest pain that is pleuritic sharp in nature more on the right side.  Worse with breathing.  No radiation to other places.  Troponin 8, , dimer 103. CTA chest demonstrated PE in distal left main pulmonary artery extending into lower lobe lobar and segmental branches. Patient was started on eliquis. Echo demonstrated EF 65%, normal systolic function. No oxygen requirements, stable on room air. Patient was determined satisfactory for discharge with appropriate follow up.    Therefore, he is discharged in fair and stable condition to home with close outpatient follow-up.    The patient recovered much more quickly than anticipated on admission.    Discharge Date  5/17/2022    FOLLOW UP ITEMS POST DISCHARGE  Please follow up with PCP in 3-5 days for post hospitalization follow up and medication reconciliation.     DISCHARGE DIAGNOSES  Active Problems:    Mixed hyperlipidemia POA: Unknown    Shortness of breath POA: Yes    Acute pulmonary embolism (HCC) POA: Yes  Resolved Problems:    * No resolved hospital problems. *      FOLLOW UP  Future " Appointments   Date Time Provider Department Center   11/14/2022 11:00 AM Fox Pandya P.A.-C. [facility] P.A.-[facility].  [address]  [place] [address] [phone]    Schedule an appointment as soon as possible for a visit in 1 week(s)        MEDICATIONS ON DISCHARGE     Medication List      START taking these medications      Instructions   * apixaban 5mg Tabs  Commonly known as: ELIQUIS   Take 2 Tablets by mouth 2 times a day for 6 days. Indications: DVT/PE  Dose: 10 mg     * apixaban 5mg Tabs  Start taking on: May 24, 2022  Commonly known as: ELIQUIS  Next Dose Due: Start next Tuesday, May 24th   Take 1 Tablet by mouth 2 times a day. Indications: DVT/PE  Dose: 5 mg         * This list has 2 medication(s) that are the same as other medications prescribed for you. Read the directions carefully, and ask your doctor or other care provider to review them with you.            CHANGE how you take these medications      Instructions   atorvastatin 40 MG Tabs  What changed: when to take this  Commonly known as: LIPITOR   TAKE 1 TABLET BY MOUTH EVERY DAY  Dose: 40 mg     Vitamin D3 1.25 MG (75364 UT) Caps  What changed:   · how much to take  · additional instructions   TAKE 1 CAPSULE BY MOUTH ONE TIME PER WEEK            Allergies  No Known Allergies    DIET  No orders of the defined types were placed in this encounter.      ACTIVITY  As tolerated.  Weight bearing as tolerated    CONSULTATIONS  N/A    PROCEDURES  N/A    LABORATORY  Lab Results   Component Value Date    SODIUM 135 05/17/2022    POTASSIUM 3.9 05/17/2022    CHLORIDE 100 05/17/2022    CO2 24 05/17/2022    GLUCOSE 115 (H) 05/17/2022    BUN 12 05/17/2022    CREATININE 0.61 05/17/2022        Lab Results   Component Value Date    WBC 8.3 05/17/2022    HEMOGLOBIN 13.9 (L) 05/17/2022    HEMATOCRIT 42.9 05/17/2022    PLATELETCT 239 05/17/2022        Total time of the discharge process exceeds 35 minutes.

## 2022-05-17 NOTE — DISCHARGE PLANNING
Case Management Discharge Planning    Admission Date: 5/16/2022  GMLOS:    ALOS: 0    6-Clicks ADL Score: 24  6-Clicks Mobility Score: 24      Anticipated Discharge Dispo: Home     DME Needed: No    Escalations Completed: None    Next Steps: YULIA RN called pts pharmacy at [facility] for price check on Eliquis. Price quoted for $3 for the starter pack, unable to get price qoute for maintenance dose at this time.     Barriers to Discharge: Medical clearance    Is the patient up for discharge tomorrow: No

## 2022-05-17 NOTE — CARE PLAN
The patient is Watcher - Medium risk of patient condition declining or worsening    Shift Goals  Clinical Goals: Maintain O2 >92%, Monitor for complications of PE  Patient Goals: Sleep    Progress made toward(s) clinical / shift goals:    Problem: Knowledge Deficit - Standard  Goal: Patient and family/care givers will demonstrate understanding of plan of care, disease process/condition, diagnostic tests and medications  Outcome: Met     Problem: Pain - Standard  Goal: Alleviation of pain or a reduction in pain to the patient’s comfort goal  Outcome: Met       Patient is not progressing towards the following goals:      Problem: Respiratory  Goal: Patient will achieve/maintain optimum respiratory ventilation and gas exchange  Outcome: Not Met  Note: Still requiring 2L NC at this time.

## 2022-05-17 NOTE — CARE PLAN
The patient is Stable - Low risk of patient condition declining or worsening    Shift Goals  Clinical Goals: wean o2, pain management  Patient Goals: ambulate    Progress made toward(s) clinical / shift goals:    Problem: Respiratory  Goal: Patient will achieve/maintain optimum respiratory ventilation and gas exchange  Outcome: Progressing  O2 sats >92% on RA     Patient is not progressing towards the following goals:

## 2022-05-18 ENCOUNTER — PATIENT OUTREACH (OUTPATIENT)
Dept: MEDICAL GROUP | Facility: PHYSICIAN GROUP | Age: 72
End: 2022-05-18
Payer: MEDICARE

## 2022-05-18 LAB
AT III ACT/NOR PPP CHRO: 92 % (ref 76–128)
PROT C ACT/NOR PPP: 94 % (ref 83–168)
PROT S FREE AG ACT/NOR PPP IA: 119 % (ref 74–147)

## 2022-05-18 NOTE — LETTER
May 18, 2022      Tereso Mr. Pleitez,    Thank you for calling me back this morning. I wanted to send you the Advanced Directive information prior to your visit with Mr. Pandya on Monday.     I have enclosed the Advanced Directive packet along with a list of Advance Directive workshops.    Please reach out if you have questions,    Anamika Ayon R.N.     Phone [phone]

## 2022-05-18 NOTE — PROGRESS NOTES
TCM outreach, attempt x1, LVM w/contact information.      RN Transitional Care Management discharge follow up call completed when pt returned call. Patient had no questions regarding medications or follow up care, verbalized understanding of Eliquis dosage. Patient scheduled discharge follow up appointment on 5/23/22 at 0920. Provided [facility] RN contact number for any additional questions/concerns. Please route chart back to RN if additional follow up is needed/requested.     Anamika Ayon, RN    Phone [phone]

## 2022-05-18 NOTE — PROGRESS NOTES
Subjective:     Amando Pleitez is a 71 y.o. male who presents for Hospital Follow-up.    POST DISCHARGE CALL:  Discharge Date:5/17/2022   Date of Outreach Call: 5/18/2022  9:50 AM  Now that you're home, how are you doing? Fair  Do you have questions about your medications? No  Did you fill your medications? Yes  Do you have a follow-up appointment scheduled?No  Discharging Department: [facility]  Number of Attempts: 1  Current or previous attempts completed within two business days of discharge? Yes  Provided education regarding treatment plan, medication, self-management, ADLs? Yes  Has patient completed Advance Directive? If yes, advise them to bring to appointment. No  Care Manager phone number provided? Yes  Is there anything else I can help you with? No    HPI:   Recently hospitalized for shortness of breath with pulmonary embolism.  He did have recent extended duration traveling both airplanes and automobiles.    Current medicines (including reconciliation performed today)  Current Outpatient Medications   Medication Sig Dispense Refill   • apixaban (ELIQUIS) 5mg Tab Take 2 Tablets by mouth 2 times a day for 6 days. Indications: DVT/PE 24 Tablet 0   • [START ON 5/24/2022] apixaban (ELIQUIS) 5mg Tab Take 1 Tablet by mouth 2 times a day. Indications: DVT/PE 60 Tablet 1   • atorvastatin (LIPITOR) 40 MG Tab TAKE 1 TABLET BY MOUTH EVERY DAY (Patient taking differently: Take 40 mg by mouth every evening.) 90 Tablet 3   • Cholecalciferol (VITAMIN D3) 1.25 MG (85578 UT) Cap TAKE 1 CAPSULE BY MOUTH ONE TIME PER WEEK (Patient taking differently: Take 50,000 Units by mouth every 7 days. On Wed) 8 Capsule 0     No current facility-administered medications for this visit.       Allergies:   Patient has no known allergies.    Social History     Tobacco Use   • Smoking status: Never Smoker   • Smokeless tobacco: Never Used   Vaping Use   • Vaping Use: Never used   Substance Use Topics   • Alcohol use: Not  Currently   • Drug use: Never       ROS:  Patient had full resolution of symptoms at this point.    Objective:   /60 (BP Location: Left arm, Patient Position: Sitting, BP Cuff Size: Adult)   Pulse 65   Temp 36.1 °C (97 °F) (Temporal)   Resp 16   Ht 1.829 m (6')   Wt 94.3 kg (208 lb)   SpO2 98%   BMI 28.21 kg/m²     Physical Exam:  NAD.  Bilateral calfs at 42 cm greatest diameter.  No unusual varicosity noted.  Popliteal space is without defect.  Negative Homans test.  Clear breath sounds throughout.    Assessment and Plan:   1. Other acute pulmonary embolism without acute cor pulmonale (HCC)    - Chart and discharge summary were reviewed.   - Hospitalization and results reviewed with patient.   - Medications reviewed including instructions regarding high risk medications, dosing and side effects.  - Recommended Services: No services needed at this time  - Advance directive/POLST on file?  No     Follow-up:Return in about 3 months (around 8/23/2022) for DC Apixiban?.    Face-to-face transitional care management services with MODERATE (today's visit is within 14 days post discharge & LACE+ score of 28-58) medical decision complexity were provided.     LACE+ Historical Score Over Time (0-28: Low, 29-58: Medium, 59+: High): 26                                                       reference text will not save to note  Coding guide   91555        - face-to-face within 14 day        - moderate decision complexity (LACE+ score of 28-58)  63576       - face-to-face within 7 days       - high medical decision complexity (LACE+ score 59+)

## 2022-05-19 LAB
CARDIOLIPIN IGA SER IA-ACNC: <10 APL
CARDIOLIPIN IGG SER IA-ACNC: <10 GPL
CARDIOLIPIN IGM SER IA-ACNC: <10 MPL

## 2022-05-19 ASSESSMENT — ENCOUNTER SYMPTOMS
HEMOPTYSIS: 0
SPUTUM PRODUCTION: 0
LEG PAIN: 0
COUGH: 0

## 2022-05-19 ASSESSMENT — COPD QUESTIONNAIRES: COPD: 0

## 2022-05-21 LAB
BACTERIA BLD CULT: NORMAL
BACTERIA BLD CULT: NORMAL
F2 C.20210G>A GENO BLD/T: NEGATIVE
F5 P.R506Q BLD/T QL: NEGATIVE
SIGNIFICANT IND 70042: NORMAL
SIGNIFICANT IND 70042: NORMAL
SITE SITE: NORMAL
SITE SITE: NORMAL
SOURCE SOURCE: NORMAL
SOURCE SOURCE: NORMAL

## 2022-05-23 ENCOUNTER — OFFICE VISIT (OUTPATIENT)
Dept: MEDICAL GROUP | Facility: PHYSICIAN GROUP | Age: 72
End: 2022-05-23
Payer: MEDICARE

## 2022-05-23 VITALS
HEART RATE: 65 BPM | HEIGHT: 72 IN | TEMPERATURE: 97 F | SYSTOLIC BLOOD PRESSURE: 128 MMHG | OXYGEN SATURATION: 98 % | RESPIRATION RATE: 16 BRPM | BODY MASS INDEX: 28.17 KG/M2 | WEIGHT: 208 LBS | DIASTOLIC BLOOD PRESSURE: 60 MMHG

## 2022-05-23 DIAGNOSIS — I26.99 OTHER ACUTE PULMONARY EMBOLISM WITHOUT ACUTE COR PULMONALE (HCC): ICD-10-CM

## 2022-05-23 PROCEDURE — 99213 OFFICE O/P EST LOW 20 MIN: CPT | Performed by: STUDENT IN AN ORGANIZED HEALTH CARE EDUCATION/TRAINING PROGRAM

## 2022-05-23 ASSESSMENT — FIBROSIS 4 INDEX: FIB4 SCORE: 1.03

## 2022-05-24 RX ORDER — CHOLECALCIFEROL (VITAMIN D3) 1250 MCG
50000 CAPSULE ORAL
Qty: 8 CAPSULE | Refills: 0 | Status: SHIPPED | OUTPATIENT
Start: 2022-05-24 | End: 2022-07-26

## 2022-07-26 RX ORDER — CHOLECALCIFEROL (VITAMIN D3) 1250 MCG
CAPSULE ORAL
Qty: 8 CAPSULE | Refills: 0 | Status: SHIPPED | OUTPATIENT
Start: 2022-07-26

## 2022-08-23 ENCOUNTER — OFFICE VISIT (OUTPATIENT)
Dept: MEDICAL GROUP | Facility: PHYSICIAN GROUP | Age: 72
End: 2022-08-23
Payer: MEDICARE

## 2022-08-23 VITALS
TEMPERATURE: 98.7 F | DIASTOLIC BLOOD PRESSURE: 70 MMHG | WEIGHT: 112.6 LBS | SYSTOLIC BLOOD PRESSURE: 132 MMHG | HEART RATE: 68 BPM | HEIGHT: 72 IN | OXYGEN SATURATION: 98 % | BODY MASS INDEX: 15.25 KG/M2 | RESPIRATION RATE: 16 BRPM

## 2022-08-23 DIAGNOSIS — I26.90 ACUTE SEPTIC PULMONARY EMBOLISM, UNSPECIFIED WHETHER ACUTE COR PULMONALE PRESENT (HCC): ICD-10-CM

## 2022-08-23 PROCEDURE — 99213 OFFICE O/P EST LOW 20 MIN: CPT | Performed by: STUDENT IN AN ORGANIZED HEALTH CARE EDUCATION/TRAINING PROGRAM

## 2022-08-23 ASSESSMENT — FIBROSIS 4 INDEX: FIB4 SCORE: 1.03

## 2022-08-23 NOTE — PROGRESS NOTES
CC:  The encounter diagnosis was Acute septic pulmonary embolism, unspecified whether acute cor pulmonale present (HCC).    HISTORY OF THE PRESENT ILLNESS: Patient is a 71 y.o. male. This pleasant patient is here today to discuss the need for continued anticoagulation.    Mr. Pleitez suffered a pulmonary embolism after extended period of time traveling both by airplane and automobile.  This occurred on 5/16/2022.  He was placed on apixaban.  He has completed a 3-month course.  The exacerbating conditions have been resolved.    He reports no current symptoms or concerns.    No problem-specific Assessment & Plan notes found for this encounter.    Current Outpatient Medications Ordered in Epic   Medication Sig Dispense Refill    Cholecalciferol (VITAMIN D3) 1.25 MG (68360 UT) Cap TAKE 1 CAPSULE BY MOUTH EVERY 7 DAYS ON WEDNESDAY 8 Capsule 0    atorvastatin (LIPITOR) 40 MG Tab TAKE 1 TABLET BY MOUTH EVERY DAY (Patient taking differently: Take 40 mg by mouth every evening.) 90 Tablet 3     No current Epic-ordered facility-administered medications on file.     ROS:   Gen: no fevers/chills, unplanned changes in weight  Pulm: no sob, no cough  CV: no chest pain/pressure, no palpitations  MSk: no myalgias      Objective:     Exam: /70 (BP Location: Left arm, Patient Position: Sitting, BP Cuff Size: Adult)   Pulse 68   Temp 37.1 °C (98.7 °F) (Temporal)   Resp 16   Ht 1.829 m (6')   Wt 51.1 kg (112 lb 9.6 oz)   SpO2 98%  Body mass index is 15.27 kg/m².    General: Normal appearing. No distress.  Neck: Supple without JVD.  Pulmonary: Clear to ausculation.  Normal effort. No rales, ronchi, or wheezing.  Cardiovascular: Regular rate and rhythm without murmur. Radial pulses are intact and equal bilaterally.  Skin: Warm and dry.  No obvious lesions.  Musculoskeletal: Normal gait. No extremity cyanosis, clubbing, or edema.  Negative Homans test bilaterally.  Calf sizes equal bilaterally.  No varicosities.    A chaperone  was offered to the patient during today's exam. Patient declined chaperone.    Labs:   No pertinent labs.    Assessment & Plan:   71 y.o. male with the following -    1. Acute septic pulmonary embolism, unspecified whether acute cor pulmonale present, subsequent encounter.  (HCC)  -Resolved  -DC apixaban  -I have counseled the patient about the need for regular breaks to walk and exercise/stretch during travel.  We have gone online together and selected compression stockings for him.  -Aspirin 81 mg daily while traveling was offered as a optional prophylactic consideration.    Return if symptoms worsen or fail to improve.    Please note that this dictation was created using voice recognition software. I have made every reasonable attempt to correct obvious errors, but I expect that there are errors of grammar and possibly content that I did not discover before finalizing the note.    Fox Pandya PA-C 8/23/2022

## 2022-11-02 ENCOUNTER — PATIENT MESSAGE (OUTPATIENT)
Dept: HEALTH INFORMATION MANAGEMENT | Facility: OTHER | Age: 72
End: 2022-11-02

## 2022-12-06 ENCOUNTER — OFFICE VISIT (OUTPATIENT)
Dept: MEDICAL GROUP | Facility: PHYSICIAN GROUP | Age: 72
End: 2022-12-06
Payer: MEDICARE

## 2022-12-06 VITALS
HEIGHT: 72 IN | HEART RATE: 78 BPM | OXYGEN SATURATION: 94 % | DIASTOLIC BLOOD PRESSURE: 64 MMHG | BODY MASS INDEX: 29.93 KG/M2 | WEIGHT: 221 LBS | SYSTOLIC BLOOD PRESSURE: 112 MMHG | TEMPERATURE: 97.8 F

## 2022-12-06 DIAGNOSIS — E78.5 HYPERLIPIDEMIA, UNSPECIFIED HYPERLIPIDEMIA TYPE: ICD-10-CM

## 2022-12-06 DIAGNOSIS — E80.6 HYPERBILIRUBINEMIA: ICD-10-CM

## 2022-12-06 DIAGNOSIS — Z23 NEED FOR VACCINATION: ICD-10-CM

## 2022-12-06 PROBLEM — E78.2 MIXED HYPERLIPIDEMIA: Status: RESOLVED | Noted: 2022-05-16 | Resolved: 2022-12-06

## 2022-12-06 PROCEDURE — 99214 OFFICE O/P EST MOD 30 MIN: CPT | Mod: 25 | Performed by: STUDENT IN AN ORGANIZED HEALTH CARE EDUCATION/TRAINING PROGRAM

## 2022-12-06 PROCEDURE — G0010 ADMIN HEPATITIS B VACCINE: HCPCS | Performed by: STUDENT IN AN ORGANIZED HEALTH CARE EDUCATION/TRAINING PROGRAM

## 2022-12-06 PROCEDURE — G0008 ADMIN INFLUENZA VIRUS VAC: HCPCS | Performed by: STUDENT IN AN ORGANIZED HEALTH CARE EDUCATION/TRAINING PROGRAM

## 2022-12-06 PROCEDURE — 90746 HEPB VACCINE 3 DOSE ADULT IM: CPT | Performed by: STUDENT IN AN ORGANIZED HEALTH CARE EDUCATION/TRAINING PROGRAM

## 2022-12-06 PROCEDURE — 90662 IIV NO PRSV INCREASED AG IM: CPT | Performed by: STUDENT IN AN ORGANIZED HEALTH CARE EDUCATION/TRAINING PROGRAM

## 2022-12-06 RX ORDER — ATORVASTATIN CALCIUM 40 MG/1
40 TABLET, FILM COATED ORAL EVERY MORNING
Qty: 90 TABLET | Refills: 1 | Status: SHIPPED | OUTPATIENT
Start: 2022-12-06 | End: 2023-06-29

## 2022-12-06 ASSESSMENT — FIBROSIS 4 INDEX: FIB4 SCORE: 1.05

## 2022-12-06 NOTE — PROGRESS NOTES
Subjective:     CC:  Diagnoses of Hyperlipidemia, unspecified hyperlipidemia type, Hyperbilirubinemia, and Need for vaccination were pertinent to this visit.    HISTORY OF THE PRESENT ILLNESS: Patient is a 72 y.o. male. This pleasant patient is here today to discuss:    1. Hyperlipidemia, unspecified hyperlipidemia type  Atorvastatin 40 mg daily.  Patient reports no myalgias.  He is in need of refills.    2. Hyperbilirubinemia  Asymptomatic laboratory finding.  Patient denies any knowledge of family from the [facility] region.    3. Need for vaccination  Patient desires his influenza and hepatitis B vaccine.    Active Diagnosis:    Patient Active Problem List   Diagnosis    Hyperlipidemia    Abnormal colonoscopy    Memory changes    Slow transit constipation    Elevated alkaline phosphatase level    Elevated fasting blood sugar    Elevated parathyroid hormone    Shortness of breath    Acute pulmonary embolism (HCC)    Hyperbilirubinemia      Current Outpatient Medications Ordered in Epic   Medication Sig Dispense Refill    atorvastatin (LIPITOR) 40 MG Tab Take 1 Tablet by mouth every morning. 90 Tablet 1    Cholecalciferol (VITAMIN D3) 1.25 MG (47202 UT) Cap TAKE 1 CAPSULE BY MOUTH EVERY 7 DAYS ON WEDNESDAY (Patient taking differently: Take 250 mcg by mouth every 7 days. 10,000 IU) 8 Capsule 0     No current Epic-ordered facility-administered medications on file.     ROS:   Gen: No fevers/chills, no changes in weight  HEENT: No changes in vision/hearing, sore throat.  Pulm: No cough, unexplained SOB.  CV: No chest pain/pressure, no palpitations  GI: No nausea/vomiting, no diarrhea  : No dysuria/nocturia  MSk: No myalgias  Skin: No rash/skin changes  Neuro: No headaches, no numbness/tingling  Heme/Lymph: no easy bruising      Objective:     Exam: /64 (BP Location: Right arm, Patient Position: Sitting, BP Cuff Size: Adult)   Pulse 78   Temp 36.6 °C (97.8 °F) (Temporal)   Ht 1.829 m (6')   Wt 100 kg (221  lb)   SpO2 94%  Body mass index is 29.97 kg/m².    General: Normal appearing. No distress.  HEENT: Normocephalic. Eyes conjunctiva clear lids without ptosis.   Neck: Supple without JVD. Thyroid is not enlarged.  Pulmonary: Clear to ausculation.  Normal effort. No rales, ronchi, or wheezing.  Cardiovascular: Regular rate and rhythm without murmur. Abdomen: Nondistended   neurologic: Grossly nonfocal.    Skin: Warm and dry.  No obvious lesions.  Musculoskeletal: Normal gait. No extremity cyanosis, clubbing, or edema.  Psych: Normal mood and affect. Alert and oriented x3. Judgment and insight are normal.    A chaperone was offered to the patient during today's exam. Patient declined chaperone.    Labs:   5/17/2022:  -CBC, RBCs 4.25 to, H&H 15.9/42.9  -CMP, glucose 115 bilirubin 2.2.    12/7/2021:   -Total cholesterol 225, triglycerides 78, HDL 54, .    Assessment & Plan:   72 y.o. male with the following -    1. Hyperlipidemia, unspecified hyperlipidemia type  -Chronic, stable.  - atorvastatin (LIPITOR) 40 MG Tab; Take 1 Tablet by mouth every morning.  Dispense: 90 Tablet; Refill: 1  - Lipid Profile; Future    2. Hyperbilirubinemia  - Undiagnosed new problem with uncertain prognosis.  -This is shown up on more than 1 CMP.  Patient does not have any Mediterranean ancestry to the best of his knowledge.  - BILIRUBIN DIRECT; Future  - BILIRUBIN TOTAL; Future    3. Need for vaccination  -Vaccines provided in clinic today.  - INFLUENZA VACCINE, HIGH DOSE (65+ ONLY)  - Hepatitis B Vaccine Adult 20+    Return in about 1 year (around 12/6/2023).    Please note that this dictation was created using voice recognition software. I have made every reasonable attempt to correct obvious errors, but I expect that there are errors of grammar and possibly content that I did not discover before finalizing the note.      Fox Pandya PA-C 12/6/2022

## 2023-01-05 ENCOUNTER — TELEPHONE (OUTPATIENT)
Dept: MEDICAL GROUP | Facility: PHYSICIAN GROUP | Age: 73
End: 2023-01-05
Payer: MEDICARE

## 2023-01-05 DIAGNOSIS — Z23 NEED FOR VACCINATION: ICD-10-CM

## 2023-01-05 NOTE — TELEPHONE ENCOUNTER
Patient is on the MA Schedule tomorrow for HEP B vaccine/injection.    SPECIFIC Action To Be Taken: Orders pending, please sign.

## 2023-01-06 ENCOUNTER — NON-PROVIDER VISIT (OUTPATIENT)
Dept: MEDICAL GROUP | Facility: PHYSICIAN GROUP | Age: 73
End: 2023-01-06
Payer: MEDICARE

## 2023-01-06 PROCEDURE — 90746 HEPB VACCINE 3 DOSE ADULT IM: CPT | Performed by: STUDENT IN AN ORGANIZED HEALTH CARE EDUCATION/TRAINING PROGRAM

## 2023-01-06 PROCEDURE — G0010 ADMIN HEPATITIS B VACCINE: HCPCS | Performed by: STUDENT IN AN ORGANIZED HEALTH CARE EDUCATION/TRAINING PROGRAM

## 2023-01-06 NOTE — PROGRESS NOTES
"Amando Pleitez is a 72 y.o. male here for a non-provider visit for:   HEPATITIS B 2 of 3    Reason for immunization: continue or complete series started at the office  Immunization records indicate need for vaccine: Yes, confirmed with Epic  Minimum interval has been met for this vaccine: Yes  ABN completed: Not Indicated    VIS Dated   was given to patient: Yes  All IAC Questionnaire questions were answered \"No.\"    Patient tolerated injection and no adverse effects were observed or reported: Yes    Pt scheduled for next dose in series: Yes  "

## 2023-01-21 ENCOUNTER — HOSPITAL ENCOUNTER (OUTPATIENT)
Dept: LAB | Facility: MEDICAL CENTER | Age: 73
End: 2023-01-21
Attending: STUDENT IN AN ORGANIZED HEALTH CARE EDUCATION/TRAINING PROGRAM
Payer: MEDICARE

## 2023-01-21 DIAGNOSIS — R74.8 ELEVATED ALKALINE PHOSPHATASE LEVEL: ICD-10-CM

## 2023-01-21 DIAGNOSIS — E80.6 HYPERBILIRUBINEMIA: ICD-10-CM

## 2023-01-21 DIAGNOSIS — E78.5 HYPERLIPIDEMIA, UNSPECIFIED HYPERLIPIDEMIA TYPE: ICD-10-CM

## 2023-01-21 DIAGNOSIS — R79.89 ELEVATED PARATHYROID HORMONE: ICD-10-CM

## 2023-01-21 LAB
ALP SERPL-CCNC: 129 U/L (ref 30–99)
BILIRUB CONJ SERPL-MCNC: 0.2 MG/DL (ref 0.1–0.5)
BILIRUB INDIRECT SERPL-MCNC: 1.4 MG/DL (ref 0–1)
BILIRUB SERPL-MCNC: 1.6 MG/DL (ref 0.1–1.5)
CHOLEST SERPL-MCNC: 169 MG/DL (ref 100–199)
FASTING STATUS PATIENT QL REPORTED: NORMAL
HDLC SERPL-MCNC: 55 MG/DL
LDLC SERPL CALC-MCNC: 95 MG/DL
PTH-INTACT SERPL-MCNC: 65.2 PG/ML (ref 14–72)
TRIGL SERPL-MCNC: 93 MG/DL (ref 0–149)

## 2023-01-21 PROCEDURE — 83970 ASSAY OF PARATHORMONE: CPT

## 2023-01-21 PROCEDURE — 82248 BILIRUBIN DIRECT: CPT

## 2023-01-21 PROCEDURE — 84075 ASSAY ALKALINE PHOSPHATASE: CPT

## 2023-01-21 PROCEDURE — 82247 BILIRUBIN TOTAL: CPT

## 2023-01-21 PROCEDURE — 80061 LIPID PANEL: CPT

## 2023-01-21 PROCEDURE — 36415 COLL VENOUS BLD VENIPUNCTURE: CPT

## 2023-01-23 DIAGNOSIS — E80.6 HYPERBILIRUBINEMIA: ICD-10-CM

## 2023-01-23 DIAGNOSIS — R74.8 ELEVATED ALKALINE PHOSPHATASE LEVEL: ICD-10-CM

## 2023-04-13 ENCOUNTER — DOCUMENTATION (OUTPATIENT)
Dept: HEALTH INFORMATION MANAGEMENT | Facility: OTHER | Age: 73
End: 2023-04-13
Payer: MEDICARE

## 2023-04-13 ENCOUNTER — OFFICE VISIT (OUTPATIENT)
Dept: URGENT CARE | Facility: PHYSICIAN GROUP | Age: 73
End: 2023-04-13
Payer: MEDICARE

## 2023-04-13 VITALS
RESPIRATION RATE: 16 BRPM | BODY MASS INDEX: 30.37 KG/M2 | WEIGHT: 224.2 LBS | HEIGHT: 72 IN | HEART RATE: 63 BPM | OXYGEN SATURATION: 97 % | TEMPERATURE: 97.3 F

## 2023-04-13 DIAGNOSIS — R03.0 ELEVATED BLOOD PRESSURE READING: ICD-10-CM

## 2023-04-13 DIAGNOSIS — H92.01 EAR PAIN, RIGHT: ICD-10-CM

## 2023-04-13 PROCEDURE — 99213 OFFICE O/P EST LOW 20 MIN: CPT | Performed by: REGISTERED NURSE

## 2023-04-13 ASSESSMENT — ENCOUNTER SYMPTOMS: DIZZINESS: 0

## 2023-04-13 ASSESSMENT — FIBROSIS 4 INDEX: FIB4 SCORE: 1.05

## 2023-04-13 NOTE — PROGRESS NOTES
Chief Complaint   Patient presents with    Foreign Body in Ear     R ear, piece of airpod broke off in ear, irritation, no hearing loss or pain,      HPI:   Amando Pleitez is a 72 y.o. male who is presenting for sensation of foreign body in right ear canal.  Reports he fell asleep with his earbuds in and woke up in the middle of the night with some discomfort in right ear canal, also noticed the screen was missing from this earbud.  Thinks it is lodged in his ear.  No discharge or drainage from the ear.  Does note that he is missing the screen from his left ear but is well.    Patient Active Problem List    Diagnosis Date Noted    Hyperbilirubinemia 12/06/2022    Shortness of breath 05/16/2022    Acute pulmonary embolism (HCC) 05/16/2022    Elevated parathyroid hormone 01/26/2022    Elevated alkaline phosphatase level 12/07/2021    Elevated fasting blood sugar 12/07/2021    Memory changes 10/28/2020    Slow transit constipation 10/28/2020    Hyperlipidemia 11/14/2016    Abnormal colonoscopy 11/11/2016     No Known Allergies     Current Outpatient Medications Ordered in Epic   Medication Sig Dispense Refill    atorvastatin (LIPITOR) 40 MG Tab Take 1 Tablet by mouth every morning. 90 Tablet 1    Cholecalciferol (VITAMIN D3) 1.25 MG (65235 UT) Cap TAKE 1 CAPSULE BY MOUTH EVERY 7 DAYS ON WEDNESDAY (Patient taking differently: Take 250 mcg by mouth every 7 days. 10,000 IU) 8 Capsule 0     No current Epic-ordered facility-administered medications on file.     Pertinent history: None    Social History     Tobacco Use    Smoking status: Never    Smokeless tobacco: Never   Vaping Use    Vaping Use: Never used   Substance Use Topics    Alcohol use: Yes     Alcohol/week: 3.0 oz     Types: 5 Glasses of wine per week     Comment: Per pt, 5-6 glasses of wine weekly    Drug use: Never       Review of Systems   HENT:  Negative for ear discharge and hearing loss.    Neurological:  Negative for dizziness.      Vitals:    04/13/23  1010   Pulse: 63   Resp: 16   Temp: 36.3 °C (97.3 °F)   SpO2: 97%   BP : 144/86    Physical Exam  Vitals and nursing note reviewed.   Constitutional:       General: He is not in acute distress.     Appearance: Normal appearance. He is well-developed. He is not diaphoretic.   HENT:      Head: Normocephalic.      Right Ear: Hearing, tympanic membrane, ear canal and external ear normal. There is impacted cerumen (Small amount).      Left Ear: Hearing, tympanic membrane, ear canal and external ear normal.      Nose: Nose normal.      Mouth/Throat:      Dentition: Normal dentition. No dental caries.   Eyes:      Conjunctiva/sclera: Conjunctivae normal.   Cardiovascular:      Rate and Rhythm: Normal rate.   Pulmonary:      Effort: Pulmonary effort is normal.   Musculoskeletal:      Cervical back: Normal range of motion.   Skin:     General: Skin is warm and dry.   Neurological:      General: No focal deficit present.      Mental Status: He is alert and oriented to person, place, and time.   Psychiatric:         Mood and Affect: Mood normal.     Assessment/Plan:  1. Ear pain, right        2. Elevated blood pressure reading        Amando Pleitez is a pleasant 72-year-old male presenting with right ear discomfort stating that he feels like something is in his right ear canal, also notes he woke up and noticed the screen was missing from his earbud.  Otoscopic exam reveals normal ear canal and TM bilaterally, no foreign bodies appreciated.  Patient requested ear lavage to ensure if there is no foreign body.  Avulsion was performed without complications, there was no foreign bodies removed, TM is intact, no drainage.  Monitor symptoms.  Do not sleep with your earbuds in.  Vital signs did show a slightly elevated blood pressure 144/86, did discuss this, recommend he monitor and follow-up with primary care for additional evaluation.    Return to clinic or go to ED if symptoms worsen or persist. Indications for ED discussed  at length. Patient/Parent/Guardian voices understanding. Follow-up with your primary care provider in 3-5 days. Red flag symptoms discussed. All side effects of medication discussed including allergic response, GI upset, tendon injury, rash, sedation etc.    I personally reviewed prior external notes and test results pertinent to today's visit as well as additional imaging and testing completed in clinic today.     Please note that this dictation was created using voice recognition software. I have made every reasonable attempt to correct obvious errors, but I expect that there are errors of grammar and possibly content that I did not discover before finalizing the note.

## 2023-04-14 ENCOUNTER — HOSPITAL ENCOUNTER (OUTPATIENT)
Dept: LAB | Facility: MEDICAL CENTER | Age: 73
End: 2023-04-14
Attending: STUDENT IN AN ORGANIZED HEALTH CARE EDUCATION/TRAINING PROGRAM
Payer: MEDICARE

## 2023-04-14 DIAGNOSIS — R74.8 ELEVATED ALKALINE PHOSPHATASE LEVEL: ICD-10-CM

## 2023-04-14 DIAGNOSIS — E80.6 HYPERBILIRUBINEMIA: ICD-10-CM

## 2023-04-14 LAB
GGT SERPL-CCNC: 50 U/L (ref 7–51)
HGB RETIC QN AUTO: 37.5 PG/CELL (ref 29–35)
IMM RETICS NFR: 17.1 % (ref 9.3–17.4)
RETICS # AUTO: 0.09 M/UL (ref 0.04–0.06)
RETICS/RBC NFR: 1.8 % (ref 0.8–2.1)

## 2023-04-14 PROCEDURE — 36415 COLL VENOUS BLD VENIPUNCTURE: CPT

## 2023-04-14 PROCEDURE — 82977 ASSAY OF GGT: CPT

## 2023-04-14 PROCEDURE — 85046 RETICYTE/HGB CONCENTRATE: CPT

## 2023-04-14 PROCEDURE — 83915 ASSAY OF NUCLEOTIDASE: CPT

## 2023-04-14 PROCEDURE — 83010 ASSAY OF HAPTOGLOBIN QUANT: CPT

## 2023-04-16 LAB — HAPTOGLOB SERPL-MCNC: 129 MG/DL (ref 30–200)

## 2023-04-17 LAB — 5NT SERPL-CCNC: 15 U/L (ref 0–15)

## 2023-06-08 ENCOUNTER — NON-PROVIDER VISIT (OUTPATIENT)
Dept: MEDICAL GROUP | Facility: PHYSICIAN GROUP | Age: 73
End: 2023-06-08
Payer: MEDICARE

## 2023-06-08 ENCOUNTER — TELEPHONE (OUTPATIENT)
Dept: MEDICAL GROUP | Facility: PHYSICIAN GROUP | Age: 73
End: 2023-06-08

## 2023-06-08 VITALS — DIASTOLIC BLOOD PRESSURE: 66 MMHG | SYSTOLIC BLOOD PRESSURE: 118 MMHG

## 2023-06-08 DIAGNOSIS — Z23 NEED FOR VACCINATION: ICD-10-CM

## 2023-06-08 PROCEDURE — 90746 HEPB VACCINE 3 DOSE ADULT IM: CPT | Performed by: STUDENT IN AN ORGANIZED HEALTH CARE EDUCATION/TRAINING PROGRAM

## 2023-06-08 PROCEDURE — G0010 ADMIN HEPATITIS B VACCINE: HCPCS | Performed by: STUDENT IN AN ORGANIZED HEALTH CARE EDUCATION/TRAINING PROGRAM

## 2023-06-08 NOTE — PROGRESS NOTES
"Amando Pleitez is a 72 y.o. male here for a non-provider visit for BP check and Immunization    Encounter Vitals  Blood Pressure : 118/66    If abnormal, was the Registered Nurse (office provider if RN is unavailable) notified today? N/a    Routed to PCP/Requested Provider?   Yes    Amando Pleitez is a 72 y.o. male here for a non-provider visit for:   HEPATITIS B 3 of 3    Reason for immunization: continue or complete series started at the office  Immunization records indicate need for vaccine: Yes, confirmed with Epic  Minimum interval has been met for this vaccine: Yes  ABN completed: Not Indicated    VIS Dated  5/12/23 was given to patient: Yes  All IAC Questionnaire questions were answered \"No.\"    Patient tolerated injection and no adverse effects were observed or reported: Yes    Pt scheduled for next dose in series: No    "

## 2023-06-29 DIAGNOSIS — E78.5 HYPERLIPIDEMIA, UNSPECIFIED HYPERLIPIDEMIA TYPE: ICD-10-CM

## 2023-06-29 RX ORDER — ATORVASTATIN CALCIUM 40 MG/1
TABLET, FILM COATED ORAL
Qty: 90 TABLET | Refills: 0 | Status: SHIPPED | OUTPATIENT
Start: 2023-06-29 | End: 2023-10-02

## 2023-08-10 ENCOUNTER — DOCUMENTATION (OUTPATIENT)
Dept: HEALTH INFORMATION MANAGEMENT | Facility: OTHER | Age: 73
End: 2023-08-10
Payer: MEDICARE

## 2023-08-30 NOTE — ED NOTES
----- Message from Paul Davis MD sent at 8/30/2023  8:41 AM EDT -----  Approved    ----- Message -----  From: Aleja Iyer  Sent: 8/28/2023   9:41 AM EDT  To: Sleep Medicine [facility] Provider    [facility]     If approved please sign and return to clerical pool. If denied please include reasons why. Also provide alternative testing if warranted. Please sign and return to clerical pool. Pt resting on joshua, wife at bedside

## 2023-10-01 DIAGNOSIS — E78.5 HYPERLIPIDEMIA, UNSPECIFIED HYPERLIPIDEMIA TYPE: ICD-10-CM

## 2023-10-02 RX ORDER — ATORVASTATIN CALCIUM 40 MG/1
TABLET, FILM COATED ORAL
Qty: 90 TABLET | Refills: 0 | Status: SHIPPED | OUTPATIENT
Start: 2023-10-02 | End: 2023-10-25 | Stop reason: SDUPTHER

## 2023-10-25 ENCOUNTER — OFFICE VISIT (OUTPATIENT)
Dept: MEDICAL GROUP | Facility: PHYSICIAN GROUP | Age: 73
End: 2023-10-25
Payer: MEDICARE

## 2023-10-25 VITALS
BODY MASS INDEX: 30.54 KG/M2 | DIASTOLIC BLOOD PRESSURE: 78 MMHG | OXYGEN SATURATION: 95 % | HEIGHT: 73 IN | RESPIRATION RATE: 21 BRPM | SYSTOLIC BLOOD PRESSURE: 124 MMHG | TEMPERATURE: 98.4 F | WEIGHT: 230.4 LBS | HEART RATE: 87 BPM

## 2023-10-25 DIAGNOSIS — R93.3 ABNORMAL COLONOSCOPY: ICD-10-CM

## 2023-10-25 DIAGNOSIS — L82.1 SEBORRHEIC KERATOSIS: ICD-10-CM

## 2023-10-25 DIAGNOSIS — Z12.11 COLON CANCER SCREENING: ICD-10-CM

## 2023-10-25 DIAGNOSIS — Z23 NEED FOR VACCINATION: ICD-10-CM

## 2023-10-25 DIAGNOSIS — Z86.711 HISTORY OF PULMONARY EMBOLISM: ICD-10-CM

## 2023-10-25 DIAGNOSIS — R79.89 ELEVATED PARATHYROID HORMONE: ICD-10-CM

## 2023-10-25 DIAGNOSIS — Z12.5 ENCOUNTER FOR PROSTATE CANCER SCREENING: ICD-10-CM

## 2023-10-25 DIAGNOSIS — E78.5 HYPERLIPIDEMIA, UNSPECIFIED HYPERLIPIDEMIA TYPE: ICD-10-CM

## 2023-10-25 DIAGNOSIS — D64.9 ANEMIA, UNSPECIFIED TYPE: ICD-10-CM

## 2023-10-25 PROCEDURE — 3078F DIAST BP <80 MM HG: CPT

## 2023-10-25 PROCEDURE — G0008 ADMIN INFLUENZA VIRUS VAC: HCPCS

## 2023-10-25 PROCEDURE — 90662 IIV NO PRSV INCREASED AG IM: CPT

## 2023-10-25 PROCEDURE — 3074F SYST BP LT 130 MM HG: CPT

## 2023-10-25 PROCEDURE — 99214 OFFICE O/P EST MOD 30 MIN: CPT | Mod: 25

## 2023-10-25 RX ORDER — ATORVASTATIN CALCIUM 40 MG/1
40 TABLET, FILM COATED ORAL EVERY MORNING
Qty: 90 TABLET | Refills: 3 | Status: SHIPPED | OUTPATIENT
Start: 2023-10-25

## 2023-10-25 ASSESSMENT — FIBROSIS 4 INDEX: FIB4 SCORE: 1.06

## 2023-10-25 ASSESSMENT — PATIENT HEALTH QUESTIONNAIRE - PHQ9: CLINICAL INTERPRETATION OF PHQ2 SCORE: 0

## 2023-10-25 NOTE — ASSESSMENT & PLAN NOTE
Patient has been on atorvastatin 40mg QHS.   Lab Results   Component Value Date/Time    CHOLSTRLTOT 169 01/21/2023 11:07 AM    TRIGLYCERIDE 93 01/21/2023 11:07 AM    HDL 55 01/21/2023 11:07 AM    LDL 95 01/21/2023 11:07 AM   ]  The 10-year ASCVD risk score (Nick SILVER, et al., 2019) is: 16.4%

## 2023-10-25 NOTE — ASSESSMENT & PLAN NOTE
Found to have a PE after hiking, was on a blood thinner for about 6 months after. He does wear compression stocking and walks while on the plane. Going to [place] in [place].

## 2023-10-25 NOTE — PROGRESS NOTES
"CC:   Chief Complaint   Patient presents with    Establish Care     Refills  Pt has growth on arm that has been there for a couple weeks.   Weight loss         HISTORY OF PRESENT ILLNESS: Patient is a 73 y.o. male established patient who presents today to discuss the following problems below:     Hyperlipidemia  Patient has been on atorvastatin 40mg QHS.   Lab Results   Component Value Date/Time    CHOLSTRLTOT 169 01/21/2023 11:07 AM    TRIGLYCERIDE 93 01/21/2023 11:07 AM    HDL 55 01/21/2023 11:07 AM    LDL 95 01/21/2023 11:07 AM   ]  The 10-year ASCVD risk score (Nick SILVER, et al., 2019) is: 16.4%      Elevated parathyroid hormone  Resolved after vitamin D supplementation.     Seborrheic keratosis  Noticed a spot on his forearm that appeared about a month ago, dark, scaly. Not itching, not bleeding. Not growing in size.     History of pulmonary embolism  Found to have a PE after hiking, was on a blood thinner for about 6 months after. He does wear compression stocking and walks while on the plane. Going to [place] in [place].     Patient wishes to lose some weight, knows that he needs to reduce his food intake.     Review of Systems: Otherwise negative except for as stated above.      Exam: /78   Pulse 87   Temp 36.9 °C (98.4 °F) (Temporal)   Resp (!) 21   Ht 1.854 m (6' 1\")   Wt 105 kg (230 lb 6.4 oz)   SpO2 95%  Body mass index is 30.4 kg/m².    Physical Exam  Vitals reviewed.   Constitutional:       Appearance: Normal appearance.   Eyes:      Extraocular Movements: Extraocular movements intact.   Pulmonary:      Effort: Pulmonary effort is normal.   Neurological:      General: No focal deficit present.      Mental Status: He is alert and oriented to person, place, and time.   Psychiatric:         Mood and Affect: Mood normal.         Behavior: Behavior normal.         Assessment/Plan:  73 y.o. male with the following -    1. Hyperlipidemia, unspecified hyperlipidemia type  Chronic, stable. Continue " medications as below. Labs due as below.  Lab Results   Component Value Date/Time    CHOLSTRLTOT 169 01/21/2023 11:07 AM    TRIGLYCERIDE 93 01/21/2023 11:07 AM    HDL 55 01/21/2023 11:07 AM    LDL 95 01/21/2023 11:07 AM   ]    - TSH WITH REFLEX TO FT4; Future  - Lipid Profile; Future  - Comp Metabolic Panel; Future  - atorvastatin (LIPITOR) 40 MG Tab; Take 1 Tablet by mouth every morning.  Dispense: 90 Tablet; Refill: 3    2. Elevated parathyroid hormone  Resolved.     3. Seborrheic keratosis  Chronic, stable. Likely seborrheic keratosis, referred to derm for eval and management.     4. History of pulmonary embolism    5. Colon cancer screening  - Referral to GI for Colonoscopy    6. Need for vaccination  - INFLUENZA VACCINE, HIGH DOSE (65+ ONLY)    7. Anemia, unspecified type  Reviewed on labs, recheck CBC. MCV normal. Unclear etiology.   - CBC WITHOUT DIFFERENTIAL; Future    8. Encounter for prostate cancer screening  - PROSTATE SPECIFIC AG SCREENING; Future           Follow-up: Return in about 4 weeks (around 11/22/2023).    Health Maintenance: Completed      Please note that this dictation was created using voice recognition software. I have made every reasonable attempt to correct obvious errors, but I expect that there are errors of grammar and possibly content that I did not discover before finalizing the note.    Electronically signed by JENNIFER Collazo on October 25, 2023

## 2023-10-25 NOTE — ASSESSMENT & PLAN NOTE
Noticed a spot on his forearm that appeared about a month ago, dark, scaly. Not itching, not bleeding. Not growing in size.

## 2023-11-08 ENCOUNTER — APPOINTMENT (RX ONLY)
Dept: URBAN - METROPOLITAN AREA CLINIC 22 | Facility: CLINIC | Age: 73
Setting detail: DERMATOLOGY
End: 2023-11-08

## 2023-11-08 PROBLEM — D48.5 NEOPLASM OF UNCERTAIN BEHAVIOR OF SKIN: Status: ACTIVE | Noted: 2023-11-08

## 2023-11-08 PROCEDURE — ? BIOPSY BY SHAVE METHOD

## 2023-11-08 PROCEDURE — 11102 TANGNTL BX SKIN SINGLE LES: CPT

## 2023-11-08 NOTE — PROCEDURE: BIOPSY BY SHAVE METHOD
Detail Level: Detailed
Depth Of Biopsy: dermis
Was A Bandage Applied: Yes
Size Of Lesion In Cm: 0.8
X Size Of Lesion In Cm: 0
Biopsy Type: H and E
Biopsy Method: Personna blade
Anesthesia Type: 0.5% lidocaine without epinephrine
Anesthesia Volume In Cc: 0.5
Hemostasis: Aluminum Chloride
Wound Care: Petrolatum
Dressing: pressure dressing with telfa
Destruction After The Procedure: No
Type Of Destruction Used: Curettage
Curettage Text: The wound bed was treated with curettage after the biopsy was performed.
Cryotherapy Text: The wound bed was treated with cryotherapy after the biopsy was performed.
Electrodesiccation Text: The wound bed was treated with electrodesiccation after the biopsy was performed.
Electrodesiccation And Curettage Text: The wound bed was treated with electrodesiccation and curettage after the biopsy was performed.
Silver Nitrate Text: The wound bed was treated with silver nitrate after the biopsy was performed.
Lab: [id number]
Lab Facility: 
Consent: Written consent was obtained and risks were reviewed including but not limited to scarring, infection, bleeding, scabbing, incomplete removal, nerve damage and allergy to anesthesia.
Post-Care Instructions: I reviewed with the patient in detail post-care instructions. Patient is to keep the biopsy site dry overnight. Gentle cleansing daily.  Apply petroleum ointment daily until healed. Patient may apply hydrogen peroxide soaks to remove any crusting.
Notification Instructions: Patient will be notified of biopsy results. However, patient instructed to call the office if not contacted within 2 weeks.
Billing Type: Third-Party Bill
Information: Selecting Yes will display possible errors in your note based on the variables you have selected. This validation is only offered as a suggestion for you. PLEASE NOTE THAT THE VALIDATION TEXT WILL BE REMOVED WHEN YOU FINALIZE YOUR NOTE. IF YOU WANT TO FAX A PRELIMINARY NOTE YOU WILL NEED TO TOGGLE THIS TO 'NO' IF YOU DO NOT WANT IT IN YOUR FAXED NOTE.

## 2024-01-10 ENCOUNTER — APPOINTMENT (RX ONLY)
Dept: URBAN - METROPOLITAN AREA CLINIC 22 | Facility: CLINIC | Age: 74
Setting detail: DERMATOLOGY
End: 2024-01-10

## 2024-01-10 DIAGNOSIS — L57.0 ACTINIC KERATOSIS: ICD-10-CM

## 2024-01-10 DIAGNOSIS — L85.3 XEROSIS CUTIS: ICD-10-CM

## 2024-01-10 PROCEDURE — 17003 DESTRUCT PREMALG LES 2-14: CPT

## 2024-01-10 PROCEDURE — ? COUNSELING

## 2024-01-10 PROCEDURE — ? LIQUID NITROGEN

## 2024-01-10 PROCEDURE — ? DIAGNOSIS COMMENT

## 2024-01-10 PROCEDURE — 17000 DESTRUCT PREMALG LESION: CPT

## 2024-01-10 PROCEDURE — 99212 OFFICE O/P EST SF 10 MIN: CPT | Mod: 25

## 2024-01-10 ASSESSMENT — LOCATION SIMPLE DESCRIPTION DERM
LOCATION SIMPLE: LEFT CHEEK
LOCATION SIMPLE: RIGHT CHEEK
LOCATION SIMPLE: RIGHT FOREARM
LOCATION SIMPLE: SUPERIOR FOREHEAD

## 2024-01-10 ASSESSMENT — LOCATION DETAILED DESCRIPTION DERM
LOCATION DETAILED: RIGHT SUPERIOR CENTRAL MALAR CHEEK
LOCATION DETAILED: RIGHT PROXIMAL DORSAL FOREARM
LOCATION DETAILED: LEFT INFERIOR LATERAL MALAR CHEEK
LOCATION DETAILED: RIGHT LATERAL MANDIBULAR CHEEK
LOCATION DETAILED: SUPERIOR MID FOREHEAD

## 2024-01-10 ASSESSMENT — LOCATION ZONE DERM
LOCATION ZONE: FACE
LOCATION ZONE: ARM

## 2024-05-08 ENCOUNTER — HOSPITAL ENCOUNTER (OUTPATIENT)
Dept: RADIOLOGY | Facility: MEDICAL CENTER | Age: 74
End: 2024-05-08
Attending: FAMILY MEDICINE
Payer: MEDICARE

## 2024-05-08 ENCOUNTER — OFFICE VISIT (OUTPATIENT)
Dept: URGENT CARE | Facility: PHYSICIAN GROUP | Age: 74
End: 2024-05-08
Payer: MEDICARE

## 2024-05-08 VITALS
DIASTOLIC BLOOD PRESSURE: 82 MMHG | BODY MASS INDEX: 30.09 KG/M2 | OXYGEN SATURATION: 97 % | WEIGHT: 227 LBS | HEIGHT: 73 IN | HEART RATE: 87 BPM | RESPIRATION RATE: 18 BRPM | SYSTOLIC BLOOD PRESSURE: 126 MMHG | TEMPERATURE: 97.6 F

## 2024-05-08 DIAGNOSIS — M72.2 PLANTAR FASCIITIS OF LEFT FOOT: ICD-10-CM

## 2024-05-08 DIAGNOSIS — M79.672 PAIN OF LEFT HEEL: ICD-10-CM

## 2024-05-08 PROCEDURE — 3079F DIAST BP 80-89 MM HG: CPT | Performed by: FAMILY MEDICINE

## 2024-05-08 PROCEDURE — 3074F SYST BP LT 130 MM HG: CPT | Performed by: FAMILY MEDICINE

## 2024-05-08 PROCEDURE — 99213 OFFICE O/P EST LOW 20 MIN: CPT | Performed by: FAMILY MEDICINE

## 2024-05-08 RX ORDER — MELOXICAM 15 MG/1
15 TABLET ORAL DAILY
Qty: 30 TABLET | Refills: 0 | Status: SHIPPED | OUTPATIENT
Start: 2024-05-08

## 2024-05-08 ASSESSMENT — ENCOUNTER SYMPTOMS
MYALGIAS: 0
WEIGHT LOSS: 0

## 2024-05-08 ASSESSMENT — FIBROSIS 4 INDEX: FIB4 SCORE: 1.06

## 2024-05-08 NOTE — PROGRESS NOTES
"Subjective     Amando Pleitez is a 73 y.o. male who presents with Foot Pain (Left heel pain )            2 months left heel pain. Slightly worse when standing first thing in the morning.  No trauma.  He is very active hiking and biking.  Minimal relief with OTC medications.  Remote PMH plantar fasciitis.  No redness warmth or swelling.  No other aggravating or alleviating factors.        Review of Systems   Constitutional:  Negative for malaise/fatigue and weight loss.   Musculoskeletal:  Negative for joint pain and myalgias.   Skin:  Negative for itching and rash.              Objective     /82   Pulse 87   Temp 36.4 °C (97.6 °F) (Temporal)   Resp 18   Ht 1.854 m (6' 1\")   Wt 103 kg (227 lb)   SpO2 97%   BMI 29.95 kg/m²      Physical Exam  Constitutional:       General: He is not in acute distress.     Appearance: He is well-developed.   HENT:      Head: Normocephalic and atraumatic.   Eyes:      Conjunctiva/sclera: Conjunctivae normal.   Musculoskeletal:        Feet:    Skin:     General: Skin is warm and dry.      Findings: No rash.   Neurological:      Mental Status: He is alert.                             Assessment & Plan   Xray left heel negative per radiology    1. Pain of left heel  DX-OS CALCIS (HEEL) 2+ LEFT    Referral to Podiatry    meloxicam (MOBIC) 15 MG tablet      2. Plantar fasciitis of left foot          Differential diagnosis, natural history, supportive care, and indications for immediate follow-up were discussed.     90 degree night splint.  Ice and NSAID as needed.  Rest.  Follow-up with podiatry.             "

## 2024-06-04 ENCOUNTER — OFFICE VISIT (OUTPATIENT)
Dept: URGENT CARE | Facility: PHYSICIAN GROUP | Age: 74
End: 2024-06-04
Payer: MEDICARE

## 2024-06-04 ENCOUNTER — HOSPITAL ENCOUNTER (OUTPATIENT)
Dept: RADIOLOGY | Facility: MEDICAL CENTER | Age: 74
End: 2024-06-04
Attending: PHYSICIAN ASSISTANT
Payer: MEDICARE

## 2024-06-04 VITALS
BODY MASS INDEX: 29.8 KG/M2 | TEMPERATURE: 97.9 F | RESPIRATION RATE: 20 BRPM | DIASTOLIC BLOOD PRESSURE: 80 MMHG | WEIGHT: 220 LBS | HEART RATE: 144 BPM | HEIGHT: 72 IN | SYSTOLIC BLOOD PRESSURE: 134 MMHG | OXYGEN SATURATION: 95 %

## 2024-06-04 DIAGNOSIS — M17.12 ARTHRITIS OF LEFT KNEE: ICD-10-CM

## 2024-06-04 DIAGNOSIS — R00.0 TACHYCARDIA: ICD-10-CM

## 2024-06-04 DIAGNOSIS — M25.462 SWELLING OF LEFT KNEE JOINT: ICD-10-CM

## 2024-06-04 DIAGNOSIS — M25.461 SWELLING OF RIGHT KNEE JOINT: ICD-10-CM

## 2024-06-04 LAB — EKG 4674: NORMAL

## 2024-06-04 PROCEDURE — 99214 OFFICE O/P EST MOD 30 MIN: CPT | Performed by: PHYSICIAN ASSISTANT

## 2024-06-04 PROCEDURE — 73564 X-RAY EXAM KNEE 4 OR MORE: CPT | Mod: LT

## 2024-06-04 PROCEDURE — 3075F SYST BP GE 130 - 139MM HG: CPT | Performed by: PHYSICIAN ASSISTANT

## 2024-06-04 PROCEDURE — 3079F DIAST BP 80-89 MM HG: CPT | Performed by: PHYSICIAN ASSISTANT

## 2024-06-04 ASSESSMENT — ENCOUNTER SYMPTOMS
EYE DISCHARGE: 0
PALPITATIONS: 0
FEVER: 0
JOINT SWELLING: 1
EYE REDNESS: 0
SHORTNESS OF BREATH: 0

## 2024-06-04 NOTE — PROGRESS NOTES
Subjective     Amando Pleitez is a 73 y.o. male who presents with Knee Pain (X2weeks. L knee, swollen, difficulty moving)            This is a new problem.  The patient presents to clinic complaining of pain and swelling to the left knee x 2 weeks.  The patient reports no recent injury or trauma to his left knee.  The patient states he has developed diffuse swelling of the left knee with associated pain.  The patient states the pain is worse with walking and/or standing.  The patient has decreased range of motion of the left knee secondary to swelling.  He reports no associated bruising or redness.  The patient notes slight radiation of pain to his left calf, but reports no swelling of the left lower extremity.  The patient denies numbness, tingling, weakness.  The patient has not taken any OTC medications for his current symptoms.  The patient states he was seen in clinic approximately 1 month ago for plantar fasciitis of the left foot.  The patient states he was prescribed meloxicam at that time, which he continues to take.    Knee Pain  Associated symptoms include joint swelling. Pertinent negatives include no chest pain or fever.     PMH:  has a past medical history of Hypercholesteremia and Vitamin D deficiency.    He has no past medical history of Allergy.  MEDS:   Current Outpatient Medications:     meloxicam (MOBIC) 15 MG tablet, Take 1 Tablet by mouth every day., Disp: 30 Tablet, Rfl: 0    atorvastatin (LIPITOR) 40 MG Tab, Take 1 Tablet by mouth every morning., Disp: 90 Tablet, Rfl: 3  ALLERGIES: No Known Allergies  SURGHX: No past surgical history on file.  SOCHX:  reports that he has never smoked. He has never used smokeless tobacco. He reports current alcohol use of about 6.0 oz of alcohol per week. He reports that he does not use drugs.  FH: Family history was reviewed, no pertinent findings to report    Review of Systems   Constitutional:  Negative for fever.   Eyes:  Negative for discharge and  redness.   Respiratory:  Negative for shortness of breath.    Cardiovascular:  Negative for chest pain, palpitations and leg swelling.   Musculoskeletal:  Positive for joint pain and joint swelling.              Objective     /80   Pulse (!) 144   Temp 36.6 °C (97.9 °F) (Temporal)   Resp 20   Ht 1.829 m (6')   Wt 99.8 kg (220 lb)   SpO2 95%   BMI 29.84 kg/m²      Physical Exam  Constitutional:       General: He is not in acute distress.     Appearance: Normal appearance. He is well-developed. He is not ill-appearing.   HENT:      Head: Normocephalic and atraumatic.      Right Ear: External ear normal.      Left Ear: External ear normal.   Eyes:      Extraocular Movements: Extraocular movements intact.      Conjunctiva/sclera: Conjunctivae normal.   Cardiovascular:      Rate and Rhythm: Regular rhythm. Tachycardia present.      Heart sounds: Normal heart sounds.   Pulmonary:      Effort: Pulmonary effort is normal. No respiratory distress.      Breath sounds: Normal breath sounds. No wheezing.   Musculoskeletal:      Cervical back: Normal range of motion and neck supple.      Comments:   Left Knee:  Diffuse edema of the left knee.  No tenderness to palpation.  No bony tenderness.  No tenderness overlying the patella.  No tenderness overlying the medial/lateral joint lines.  No tenderness overlying the tibial tuberosity.  No tenderness to the posterior aspect of the left knee.  No calf tenderness.  No lower extremity edema.  No palpable cords.  ROM intact -the patient demonstrates full active range of motion of the left knee  Neurovascular intact distally  Strength 5/5 -flexion/tension of the left knee against resistance  Antalgic gait   Skin:     General: Skin is warm and dry.   Neurological:      Mental Status: He is alert and oriented to person, place, and time.               Progress:  Of Note: The patient's heart rate is elevated today in clinic with a heart rate of 144.  She denies chest pain,  shortness of breath, palpitations, and lower extremity edema.  The patient states he does not feel that his heart is beating too fast.  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  The patient states at times he has been turned away from donating due to an elevated heart rate.  The patient states he did donate platelets last week without problem.  Will obtain an EKG to further evaluate the patient's elevated heart rate.    EKG:  EKG Interpretation   Interpreted by me   Rhythm: normal sinus   Rate: tachycardia at 144    Axis: normal   Ectopy: none   Conduction: normal   ST Segments: no acute change   T Waves: no acute change   Q Waves: none   Clinical Impression: abnormal EKG  This is changed from the patient's previous EKG on 5/16/2022.    Left Knee XR:  XRs reviewed by me.     COMPARISON: None     FINDINGS:  There is no significant joint effusion.     There is no evidence of displaced  fracture or dislocation.     There is minimal degenerative spurring of the tibial spines and the posterior medial and lateral patella.     IMPRESSION:  1.  There is minimal tricompartmental degenerative spurring.     Recheck:  HR: 136  POX: 94% on room air     Reviewed XR results with the patient in clinic.        Assessment & Plan      1. Swelling of left knee joint  - DX-KNEE COMPLETE 4+ LEFT; Future  - Referral to Orthopedics    2. Arthritis of left knee    The patient's presenting symptoms and physical exam findings are consistent with swelling of the left knee.  The patient reports no recent injury or trauma to his left knee.  The patient states he has developed swelling to the left the, as well as pain with walking and standing.  An x-ray of the patient's left knee was obtained today in clinic which showed minimal tricompartmental degenerative spurring.  This may be contributing to the patient's current swelling of the left knee.  Will place referral to orthopedics for further evaluation and management.  Provided the  patient with a knee brace for additional support and mild compression.  Advised the patient to monitor for worsening signs or symptoms.  Recommend OTC medications and supportive care for symptomatic management.  Recommend patient follow-up with primary care as needed.  Discussed return precautions with the patient, and he verbalized understanding.    Differential diagnoses, supportive care, and indications for immediate follow-up discussed with patient.   Instructed to return to clinic or nearest emergency department for any change in condition, further concerns, or worsening of symptoms.    OTC NSAIDs for pain/discomfort   -- Advised the patient to discontinue Meloxicam prior to taking OTC anti-inflammatories.  RICE  Wear brace for additional support  Weight-bearing as tolerated  Follow-up with PCP   Return to clinic or go tot he ED if symptoms worsen or fail to improve, or if the patient should develop worsening/increasing pain/tenderness, swelling, bruising, redness or warmth to the affected area, decreased ROM, numbness, tingling or weakness, difficulty walking, fever/chills, and/or any concerning symptoms.       3. Tachycardia  - EKG  - REFERRAL TO CARDIOLOGY    The patient's heart rate was found to be elevated today in clinic with a heart rate of 144.  The patient denies chest pain, shortness of breath, palpitations, and lower extremity edema.  The patient states he does not feel that his heart is beating too fast.  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  The patient states at times he has been turned away from donating due to an elevated heart rate.  The patient states he did donate platelets last week without problem.  Will obtain an EKG to further evaluate the patient's elevated heart rate.  The patient's EKG today in clinic showed sinus tachycardia with a heart rate of 144.  This is changed from patient's previous EKG on 5/16/2022.  Again, the patient is currently a symptomatic.  Will place an  urgent referral to cardiology for further evaluation and management of the patient's acute sinus tachycardia.  Advised the patient to monitor for worsening signs and/or symptoms.  Discussed strict ED precautions with patient, and he verbalized understanding.  Recommend patient follow-up with primary care.    Differential diagnoses, supportive care, and indications for immediate follow-up discussed with patient.   Instructed to return to clinic or nearest emergency department for any change in condition, further concerns, or worsening of symptoms.    I personally reviewed prior external notes and test results pertinent to today's visit.  I have independently reviewed and interpreted all diagnostics ordered during this urgent care visit.     Please note that this dictation was created using voice recognition software. I have made every reasonable attempt to correct obvious errors, but I expect that there may be errors of grammar and possibly content that I did not discover before finalizing the note.     This note was electronically signed by Opal Fontanez PA-C

## 2024-06-07 ENCOUNTER — TELEPHONE (OUTPATIENT)
Dept: HEALTH INFORMATION MANAGEMENT | Facility: OTHER | Age: 74
End: 2024-06-07
Payer: MEDICARE

## 2024-06-11 ENCOUNTER — TELEPHONE (OUTPATIENT)
Dept: HEALTH INFORMATION MANAGEMENT | Facility: OTHER | Age: 74
End: 2024-06-11
Payer: MEDICARE

## 2024-06-24 ENCOUNTER — OFFICE VISIT (OUTPATIENT)
Dept: CARDIOLOGY | Facility: MEDICAL CENTER | Age: 74
End: 2024-06-24
Attending: PHYSICIAN ASSISTANT
Payer: MEDICARE

## 2024-06-24 VITALS
WEIGHT: 225.8 LBS | RESPIRATION RATE: 18 BRPM | HEART RATE: 65 BPM | DIASTOLIC BLOOD PRESSURE: 82 MMHG | HEIGHT: 73 IN | BODY MASS INDEX: 29.93 KG/M2 | OXYGEN SATURATION: 95 % | SYSTOLIC BLOOD PRESSURE: 124 MMHG

## 2024-06-24 DIAGNOSIS — E78.00 PURE HYPERCHOLESTEROLEMIA: ICD-10-CM

## 2024-06-24 DIAGNOSIS — R00.0 TACHYCARDIA: ICD-10-CM

## 2024-06-24 DIAGNOSIS — R00.0 SINUS TACHYCARDIA BY ELECTROCARDIOGRAM: ICD-10-CM

## 2024-06-24 LAB — EKG IMPRESSION: NORMAL

## 2024-06-24 PROCEDURE — 3074F SYST BP LT 130 MM HG: CPT | Performed by: INTERNAL MEDICINE

## 2024-06-24 PROCEDURE — 99211 OFF/OP EST MAY X REQ PHY/QHP: CPT | Performed by: INTERNAL MEDICINE

## 2024-06-24 PROCEDURE — 93010 ELECTROCARDIOGRAM REPORT: CPT | Performed by: INTERNAL MEDICINE

## 2024-06-24 PROCEDURE — 3079F DIAST BP 80-89 MM HG: CPT | Performed by: INTERNAL MEDICINE

## 2024-06-24 PROCEDURE — 93005 ELECTROCARDIOGRAM TRACING: CPT | Performed by: INTERNAL MEDICINE

## 2024-06-24 PROCEDURE — 99202 OFFICE O/P NEW SF 15 MIN: CPT | Performed by: INTERNAL MEDICINE

## 2024-06-24 PROCEDURE — 99204 OFFICE O/P NEW MOD 45 MIN: CPT | Mod: 25 | Performed by: INTERNAL MEDICINE

## 2024-06-24 ASSESSMENT — ENCOUNTER SYMPTOMS
SENSORY CHANGE: 0
WEIGHT LOSS: 0
LOSS OF CONSCIOUSNESS: 0
MYALGIAS: 0
ORTHOPNEA: 0
DEPRESSION: 0
CHILLS: 0
HALLUCINATIONS: 0
SHORTNESS OF BREATH: 0
COUGH: 0
PALPITATIONS: 0
BLOOD IN STOOL: 0
CLAUDICATION: 0
EYE PAIN: 0
VOMITING: 0
ABDOMINAL PAIN: 0
PND: 0
BRUISES/BLEEDS EASILY: 0
EYE DISCHARGE: 0
DIZZINESS: 0
SPEECH CHANGE: 0
DOUBLE VISION: 0
FALLS: 0
BLURRED VISION: 0
NAUSEA: 0
FEVER: 0
HEADACHES: 0

## 2024-06-24 NOTE — PROGRESS NOTES
Chief Complaint   Patient presents with    Hypertension       Subjective     Amando Pleitez is a 73 y.o. male who presents today for cardiac care and evaluation due to tachycardia.  Patient was in urgent care on June 4, 2024 due to plantars fasciitis.  He had a lot of foot pain at that time.  EKG was done which found tachycardia.  Heart rate of 144.    I personally interpreted the EKG tracing which showed evidence of sinus tachycardia.    Amando Pleitez does not have any history of heart attack arrhythmias in the past. he never had transthoracic echocardiogram, cardiac catheterization or ablations procedure in the past. At this time, he denies having chest pain shortness of breath presyncopal syncopal episodes. he is able to exercise with walking for one to 2 miles without having problems of chest pain or shortness of breath. Patient is also able to climb up at least 2 flights of stairs without having symptoms.    Patient also gives and platelet donation and was found to have elevated heart rate periodically as well.    I have personally interpreted EKG today with patient, there is no evidence of acute coronary syndrome, no evidence of prior infarct, normal AR and QT interval, no significant conduction disease. Sinus rhythm.      Past Medical History:   Diagnosis Date    Hypercholesteremia     Vitamin D deficiency      History reviewed. No pertinent surgical history.  Family History   Problem Relation Age of Onset    Breast Cancer Mother     Heart Disease Father     Multiple myeloma Father     Heart Attack Father     Diabetes Maternal Grandmother     Colorectal Cancer Neg Hx      Social History     Socioeconomic History    Marital status:      Spouse name: Not on file    Number of children: Not on file    Years of education: Not on file    Highest education level: Not on file   Occupational History    Not on file   Tobacco Use    Smoking status: Never    Smokeless tobacco: Never   Vaping Use    Vaping  status: Never Used   Substance and Sexual Activity    Alcohol use: Yes     Alcohol/week: 6.0 oz     Types: 10 Glasses of wine per week     Comment: 2 glasses per night    Drug use: Never    Sexual activity: Not Currently   Other Topics Concern    Not on file   Social History Narrative    Not on file     Social Determinants of Health     Financial Resource Strain: Not on file   Food Insecurity: Not on file   Transportation Needs: Not on file   Physical Activity: Not on file   Stress: Not on file   Social Connections: Not on file   Intimate Partner Violence: Not on file   Housing Stability: Not on file     No Known Allergies  Outpatient Encounter Medications as of 6/24/2024   Medication Sig Dispense Refill    atorvastatin (LIPITOR) 40 MG Tab Take 1 Tablet by mouth every morning. 90 Tablet 3    [DISCONTINUED] meloxicam (MOBIC) 15 MG tablet Take 1 Tablet by mouth every day. 30 Tablet 0     No facility-administered encounter medications on file as of 6/24/2024.     Review of Systems   Constitutional:  Negative for chills, fever, malaise/fatigue and weight loss.   HENT:  Negative for ear discharge, ear pain, hearing loss and nosebleeds.    Eyes:  Negative for blurred vision, double vision, pain and discharge.   Respiratory:  Negative for cough and shortness of breath.    Cardiovascular:  Negative for chest pain, palpitations, orthopnea, claudication, leg swelling and PND.   Gastrointestinal:  Negative for abdominal pain, blood in stool, melena, nausea and vomiting.   Genitourinary:  Negative for dysuria and hematuria.   Musculoskeletal:  Negative for falls, joint pain and myalgias.   Skin:  Negative for itching and rash.   Neurological:  Negative for dizziness, sensory change, speech change, loss of consciousness and headaches.   Endo/Heme/Allergies:  Negative for environmental allergies. Does not bruise/bleed easily.   Psychiatric/Behavioral:  Negative for depression, hallucinations and suicidal ideas.          "      Objective     /82 (BP Location: Left arm, Patient Position: Sitting, BP Cuff Size: Adult)   Pulse 65   Resp 18   Ht 1.854 m (6' 1\")   Wt 102 kg (225 lb 12.8 oz)   SpO2 95%   BMI 29.79 kg/m²     Physical Exam  Vitals and nursing note reviewed.   Constitutional:       General: He is not in acute distress.     Appearance: He is not diaphoretic.   HENT:      Head: Normocephalic and atraumatic.      Right Ear: External ear normal.      Left Ear: External ear normal.      Nose: No congestion or rhinorrhea.   Eyes:      General:         Right eye: No discharge.         Left eye: No discharge.   Neck:      Thyroid: No thyromegaly.      Vascular: No JVD.   Cardiovascular:      Rate and Rhythm: Normal rate and regular rhythm.      Pulses: Normal pulses.   Pulmonary:      Effort: No respiratory distress.   Abdominal:      General: There is no distension.      Tenderness: There is no abdominal tenderness.   Musculoskeletal:         General: No swelling or tenderness.      Right lower leg: No edema.      Left lower leg: No edema.   Skin:     General: Skin is warm and dry.   Neurological:      Mental Status: He is alert and oriented to person, place, and time.      Cranial Nerves: No cranial nerve deficit.   Psychiatric:         Behavior: Behavior normal.                Assessment & Plan     1. Tachycardia  EKG    EC-ECHOCARDIOGRAM COMPLETE W/O CONT    EC-ECHOCARDIOGRAM REST/STRESS W/O CONT(DOES NOT INCLUDE A FULL RESTING ECHO)    Cardiac Event Monitor      2. Sinus tachycardia by electrocardiogram  EKG    EC-ECHOCARDIOGRAM COMPLETE W/O CONT    EC-ECHOCARDIOGRAM REST/STRESS W/O CONT(DOES NOT INCLUDE A FULL RESTING ECHO)    Cardiac Event Monitor      3. Pure hypercholesterolemia            Medical Decision Making: Today's Assessment/Status/Plan:   At this time, to further risk stratify, I will order a transthoracic echocardiogram to assess cardiac and valvular functions. I will also order an echocardiogram stress " test (to avoid radiation exposure in young patients) to assess for coronary ischemia.    This visit encounter signifies the visit complexity inherent to evaluation and management associated with medical care services that serve as the continuing focal point for all needed health care services and/or with medical care services that are part of ongoing care related to this patient's single, serious condition, complex cardiac condition.    Hali Ferrer M.D.

## 2024-07-12 ENCOUNTER — HOSPITAL ENCOUNTER (OUTPATIENT)
Dept: CARDIOLOGY | Facility: MEDICAL CENTER | Age: 74
End: 2024-07-12
Attending: INTERNAL MEDICINE
Payer: MEDICARE

## 2024-07-12 DIAGNOSIS — R00.0 TACHYCARDIA: ICD-10-CM

## 2024-07-12 DIAGNOSIS — R00.0 SINUS TACHYCARDIA BY ELECTROCARDIOGRAM: ICD-10-CM

## 2024-07-12 PROCEDURE — 93306 TTE W/DOPPLER COMPLETE: CPT

## 2024-07-15 ENCOUNTER — NON-PROVIDER VISIT (OUTPATIENT)
Dept: CARDIOLOGY | Facility: MEDICAL CENTER | Age: 74
End: 2024-07-15
Attending: INTERNAL MEDICINE
Payer: MEDICARE

## 2024-07-15 DIAGNOSIS — R00.0 SINUS TACHYCARDIA BY ELECTROCARDIOGRAM: ICD-10-CM

## 2024-07-15 DIAGNOSIS — R00.0 TACHYCARDIA: ICD-10-CM

## 2024-07-15 LAB
LV EJECT FRACT  99904: 64
LV EJECT FRACT MOD 2C 99903: 65.98
LV EJECT FRACT MOD 4C 99902: 62.28
LV EJECT FRACT MOD BP 99901: 63.81

## 2024-07-15 PROCEDURE — 93270 REMOTE 30 DAY ECG REV/REPORT: CPT

## 2024-07-15 PROCEDURE — 93306 TTE W/DOPPLER COMPLETE: CPT | Mod: 26 | Performed by: INTERNAL MEDICINE

## 2024-08-08 ENCOUNTER — HOSPITAL ENCOUNTER (OUTPATIENT)
Dept: CARDIOLOGY | Facility: MEDICAL CENTER | Age: 74
End: 2024-08-08
Attending: INTERNAL MEDICINE
Payer: MEDICARE

## 2024-08-08 ENCOUNTER — TELEPHONE (OUTPATIENT)
Dept: CARDIOLOGY | Facility: MEDICAL CENTER | Age: 74
End: 2024-08-08
Payer: MEDICARE

## 2024-08-08 DIAGNOSIS — R00.0 TACHYCARDIA: ICD-10-CM

## 2024-08-08 DIAGNOSIS — R00.0 SINUS TACHYCARDIA BY ELECTROCARDIOGRAM: ICD-10-CM

## 2024-08-08 PROCEDURE — 93017 CV STRESS TEST TRACING ONLY: CPT

## 2024-08-08 RX ORDER — METOPROLOL SUCCINATE 25 MG/1
25 TABLET, EXTENDED RELEASE ORAL DAILY
Qty: 90 TABLET | Refills: 2 | Status: SHIPPED | OUTPATIENT
Start: 2024-08-08

## 2024-08-08 NOTE — TELEPHONE ENCOUNTER
[place] EOS to TT's nurse, Elvia, on 8/8/2024    Preliminary findings:    139 episodes of SVT  with an avg rate of 123 bpm    Sinus rhythm  with an avg rate of 78 bpm    Supraventricular ectopy:1.6% isolated, rare couplets and triplets    Ventricular ectopy: rare isolated VE(s)    No patient events

## 2024-08-08 NOTE — TELEPHONE ENCOUNTER
Ailyn Pascual M.D.  You2 minutes ago (4:54 PM)       Frequent SVTs, recommend starting beta blocker such as metop 25 daily if BP normal

## 2024-08-09 LAB — LV EJECT FRACT  99904: 60

## 2024-08-09 PROCEDURE — 93018 CV STRESS TEST I&R ONLY: CPT | Performed by: INTERNAL MEDICINE

## 2024-08-09 PROCEDURE — 93350 STRESS TTE ONLY: CPT | Mod: 26 | Performed by: INTERNAL MEDICINE

## 2024-09-20 ENCOUNTER — OFFICE VISIT (OUTPATIENT)
Dept: CARDIOLOGY | Facility: MEDICAL CENTER | Age: 74
End: 2024-09-20
Attending: INTERNAL MEDICINE
Payer: MEDICARE

## 2024-09-20 VITALS
DIASTOLIC BLOOD PRESSURE: 80 MMHG | WEIGHT: 221 LBS | RESPIRATION RATE: 16 BRPM | OXYGEN SATURATION: 98 % | SYSTOLIC BLOOD PRESSURE: 136 MMHG | HEART RATE: 62 BPM | BODY MASS INDEX: 29.29 KG/M2 | HEIGHT: 73 IN

## 2024-09-20 DIAGNOSIS — E78.5 HYPERLIPIDEMIA, UNSPECIFIED HYPERLIPIDEMIA TYPE: ICD-10-CM

## 2024-09-20 DIAGNOSIS — I49.3 PVC'S (PREMATURE VENTRICULAR CONTRACTIONS): ICD-10-CM

## 2024-09-20 DIAGNOSIS — E78.00 PURE HYPERCHOLESTEROLEMIA: ICD-10-CM

## 2024-09-20 DIAGNOSIS — I49.1 APC (ATRIAL PREMATURE CONTRACTIONS): ICD-10-CM

## 2024-09-20 DIAGNOSIS — I47.10 PSVT (PAROXYSMAL SUPRAVENTRICULAR TACHYCARDIA) (HCC): ICD-10-CM

## 2024-09-20 DIAGNOSIS — R00.0 TACHYCARDIA: ICD-10-CM

## 2024-09-20 PROCEDURE — 99211 OFF/OP EST MAY X REQ PHY/QHP: CPT | Performed by: INTERNAL MEDICINE

## 2024-09-20 PROCEDURE — 3075F SYST BP GE 130 - 139MM HG: CPT | Performed by: INTERNAL MEDICINE

## 2024-09-20 PROCEDURE — 3079F DIAST BP 80-89 MM HG: CPT | Performed by: INTERNAL MEDICINE

## 2024-09-20 PROCEDURE — G2211 COMPLEX E/M VISIT ADD ON: HCPCS | Performed by: INTERNAL MEDICINE

## 2024-09-20 PROCEDURE — 99214 OFFICE O/P EST MOD 30 MIN: CPT | Performed by: INTERNAL MEDICINE

## 2024-09-20 RX ORDER — METOPROLOL SUCCINATE 25 MG/1
25 TABLET, EXTENDED RELEASE ORAL DAILY
Qty: 90 TABLET | Refills: 4 | Status: SHIPPED | OUTPATIENT
Start: 2024-09-20

## 2024-09-20 RX ORDER — ATORVASTATIN CALCIUM 40 MG/1
40 TABLET, FILM COATED ORAL EVERY MORNING
Qty: 100 TABLET | Refills: 4 | Status: SHIPPED | OUTPATIENT
Start: 2024-09-20

## 2024-09-20 ASSESSMENT — ENCOUNTER SYMPTOMS
HEADACHES: 0
WEIGHT LOSS: 0
DEPRESSION: 0
SPEECH CHANGE: 0
DOUBLE VISION: 0
ORTHOPNEA: 0
FALLS: 0
BRUISES/BLEEDS EASILY: 0
CHILLS: 0
VOMITING: 0
EYE DISCHARGE: 0
SHORTNESS OF BREATH: 0
SENSORY CHANGE: 0
PND: 0
FEVER: 0
NAUSEA: 0
BLOOD IN STOOL: 0
MYALGIAS: 0
HALLUCINATIONS: 0
ABDOMINAL PAIN: 0
LOSS OF CONSCIOUSNESS: 0
PALPITATIONS: 0
COUGH: 0
DIZZINESS: 0
CLAUDICATION: 0
BLURRED VISION: 0
EYE PAIN: 0

## 2024-09-20 NOTE — PROGRESS NOTES
Chief Complaint   Patient presents with    Follow-Up     F/v Dx: Tachycardia         Subjective     Amando Pleitez is a 73 y.o. male who presents today for cardiac care and evaluation due to tachycardia.  Patient was in urgent care on June 4, 2024 due to plantars fasciitis.  He had a lot of foot pain at that time.  EKG was done which found tachycardia.  Heart rate of 144.    I personally interpreted the EKG tracing which showed evidence of sinus tachycardia.    Amando Pleitez does not have any history of heart attack arrhythmias in the past. he never had transthoracic echocardiogram, cardiac catheterization or ablations procedure in the past. At this time, he denies having chest pain shortness of breath presyncopal syncopal episodes. he is able to exercise with walking for one to 2 miles without having problems of chest pain or shortness of breath. Patient is also able to climb up at least 2 flights of stairs without having symptoms.    Patient also gives and platelet donation and was found to have elevated heart rate periodically as well.    I have personally interpreted EKG today with patient, there is no evidence of acute coronary syndrome, no evidence of prior infarct, normal AR and QT interval, no significant conduction disease. Sinus rhythm.    I have independently interpreted and reviewed blood tests results with patient in clinic which shows LDL level of 95, triglycerides level of 93, GFR of 104, K of 3.9.    I personally interpreted and reviewed the tracings of event monitor with patient in clinic today which showed no significant events of sinus pause, no significant arrhythmias. no significant AV block, occasional PACs (1.6%) and rare PVCs, PSVT (not meeting criteria for diagnosis of atrial fibrillation or atrial flutter).    I have independently interpreted and reviewed echocardiogram stress test's actual images and EKG tracing with patient which showed normal left ventricular systolic function.  No coronary ischemia.    I have independently interpreted and reviewed echocardiogram's actual images with patient which showed normal left ventricular systolic function. No wall motion abnormality. No evidence of pulmonary hypertension. No significant valvular disease.    In the interim, patient has been doing well without having any symptoms. Patient denies having chest pain, dyspnea, palpitation, presyncope, syncope episodes. Able to climb up at least 2 flights of stairs.    He drinks ETOH (half a bottle of wine daily).     Past Medical History:   Diagnosis Date    Hypercholesteremia     Vitamin D deficiency      No past surgical history on file.  Family History   Problem Relation Age of Onset    Breast Cancer Mother     Heart Disease Father     Multiple myeloma Father     Heart Attack Father     Diabetes Maternal Grandmother     Colorectal Cancer Neg Hx      Social History     Socioeconomic History    Marital status:      Spouse name: Not on file    Number of children: Not on file    Years of education: Not on file    Highest education level: Not on file   Occupational History    Not on file   Tobacco Use    Smoking status: Never    Smokeless tobacco: Never   Vaping Use    Vaping status: Never Used   Substance and Sexual Activity    Alcohol use: Yes     Alcohol/week: 6.0 oz     Types: 10 Glasses of wine per week     Comment: 2 glasses with few beers per night    Drug use: Never    Sexual activity: Not Currently   Other Topics Concern    Not on file   Social History Narrative    Not on file     Social Determinants of Health     Financial Resource Strain: Not on file   Food Insecurity: Not on file   Transportation Needs: Not on file   Physical Activity: Not on file   Stress: Not on file   Social Connections: Not on file   Intimate Partner Violence: Not on file   Housing Stability: Not on file     No Known Allergies  Outpatient Encounter Medications as of 9/20/2024   Medication Sig Dispense Refill     "Multiple Vitamins-Minerals (CENTRUM SILVER PO) Take  by mouth every day.      VITAMIN D PO Take  by mouth every day.      atorvastatin (LIPITOR) 40 MG Tab Take 1 Tablet by mouth every morning. 100 Tablet 4    metoprolol SR (TOPROL XL) 25 MG TABLET SR 24 HR Take 1 Tablet by mouth every day. 90 Tablet 4    [DISCONTINUED] metoprolol SR (TOPROL XL) 25 MG TABLET SR 24 HR Take 1 Tablet by mouth every day. 90 Tablet 2    [DISCONTINUED] atorvastatin (LIPITOR) 40 MG Tab Take 1 Tablet by mouth every morning. 90 Tablet 3     No facility-administered encounter medications on file as of 9/20/2024.     Review of Systems   Constitutional:  Negative for chills, fever, malaise/fatigue and weight loss.   HENT:  Negative for ear discharge, ear pain, hearing loss and nosebleeds.    Eyes:  Negative for blurred vision, double vision, pain and discharge.   Respiratory:  Negative for cough and shortness of breath.    Cardiovascular:  Negative for chest pain, palpitations, orthopnea, claudication, leg swelling and PND.   Gastrointestinal:  Negative for abdominal pain, blood in stool, melena, nausea and vomiting.   Genitourinary:  Negative for dysuria and hematuria.   Musculoskeletal:  Negative for falls, joint pain and myalgias.   Skin:  Negative for itching and rash.   Neurological:  Negative for dizziness, sensory change, speech change, loss of consciousness and headaches.   Endo/Heme/Allergies:  Negative for environmental allergies. Does not bruise/bleed easily.   Psychiatric/Behavioral:  Negative for depression, hallucinations and suicidal ideas.               Objective     /80 (BP Location: Left arm, Patient Position: Sitting, BP Cuff Size: Adult)   Pulse 62   Resp 16   Ht 1.854 m (6' 1\")   Wt 100 kg (221 lb)   SpO2 98%   BMI 29.16 kg/m²     Physical Exam  Vitals and nursing note reviewed.   Constitutional:       General: He is not in acute distress.     Appearance: He is not diaphoretic.   HENT:      Head: Normocephalic " and atraumatic.      Right Ear: External ear normal.      Left Ear: External ear normal.      Nose: No congestion or rhinorrhea.   Eyes:      General:         Right eye: No discharge.         Left eye: No discharge.   Neck:      Thyroid: No thyromegaly.      Vascular: No JVD.   Cardiovascular:      Rate and Rhythm: Normal rate and regular rhythm.      Pulses: Normal pulses.   Pulmonary:      Effort: No respiratory distress.   Abdominal:      General: There is no distension.      Tenderness: There is no abdominal tenderness.   Musculoskeletal:         General: No swelling or tenderness.      Right lower leg: No edema.      Left lower leg: No edema.   Skin:     General: Skin is warm and dry.   Neurological:      Mental Status: He is alert and oriented to person, place, and time.      Cranial Nerves: No cranial nerve deficit.   Psychiatric:         Behavior: Behavior normal.                Assessment & Plan     1. PSVT (paroxysmal supraventricular tachycardia) (HCC)  Polysomnography, 1-3    Referral to Pulmonary and Sleep Medicine      2. PVC's (premature ventricular contractions)  Polysomnography, 1-3    Referral to Pulmonary and Sleep Medicine      3. APC (atrial premature contractions)  Polysomnography, 1-3    Referral to Pulmonary and Sleep Medicine      4. Pure hypercholesterolemia  Polysomnography, 1-3    Basic Metabolic Panel    LIPID PANEL      5. Hyperlipidemia, unspecified hyperlipidemia type  atorvastatin (LIPITOR) 40 MG Tab      6. Tachycardia  metoprolol SR (TOPROL XL) 25 MG TABLET SR 24 HR            Medical Decision Making: Today's Assessment/Status/Plan:     At this time patient is clinically stable in terms of his cardiac standpoint.  Blood pressure is well controlled.    I will refer patient to have sleep studies for obstructive sleep apnea.    Palpitations are likely related to PACs and PVCs.  I Advised patient on reduction of caffeine, ETOH intake along with adequate electrolytes  hydration.  Optimize exercise program.  Continue Toprol XL 25 mg daily.    This visit encounter signifies the visit complexity inherent to evaluation and management associated with medical care services that serve as the continuing focal point for all needed health care services and/or with medical care services that are part of ongoing care related to this patient's single, serious condition, complex cardiac condition.    Hali Ferrer M.D.

## 2025-02-07 ENCOUNTER — HOSPITAL ENCOUNTER (OUTPATIENT)
Dept: RADIOLOGY | Facility: MEDICAL CENTER | Age: 75
End: 2025-02-07
Attending: PHYSICIAN ASSISTANT
Payer: MEDICARE

## 2025-02-07 ENCOUNTER — OFFICE VISIT (OUTPATIENT)
Dept: URGENT CARE | Facility: PHYSICIAN GROUP | Age: 75
End: 2025-02-07
Payer: MEDICARE

## 2025-02-07 VITALS
RESPIRATION RATE: 15 BRPM | WEIGHT: 223.4 LBS | HEART RATE: 60 BPM | SYSTOLIC BLOOD PRESSURE: 128 MMHG | TEMPERATURE: 97.4 F | OXYGEN SATURATION: 96 % | HEIGHT: 72 IN | DIASTOLIC BLOOD PRESSURE: 64 MMHG | BODY MASS INDEX: 30.26 KG/M2

## 2025-02-07 DIAGNOSIS — S52.571A OTHER CLOSED INTRA-ARTICULAR FRACTURE OF DISTAL END OF RIGHT RADIUS, INITIAL ENCOUNTER: ICD-10-CM

## 2025-02-07 DIAGNOSIS — S62.114A CLOSED NONDISPLACED FRACTURE OF TRIQUETRUM OF RIGHT WRIST, INITIAL ENCOUNTER: ICD-10-CM

## 2025-02-07 DIAGNOSIS — S69.91XA INJURY OF RIGHT WRIST, INITIAL ENCOUNTER: ICD-10-CM

## 2025-02-07 PROCEDURE — 73110 X-RAY EXAM OF WRIST: CPT | Mod: RT

## 2025-02-07 PROCEDURE — 3074F SYST BP LT 130 MM HG: CPT | Performed by: PHYSICIAN ASSISTANT

## 2025-02-07 PROCEDURE — 3078F DIAST BP <80 MM HG: CPT | Performed by: PHYSICIAN ASSISTANT

## 2025-02-07 PROCEDURE — 99213 OFFICE O/P EST LOW 20 MIN: CPT | Performed by: PHYSICIAN ASSISTANT

## 2025-02-07 ASSESSMENT — ENCOUNTER SYMPTOMS
VOMITING: 0
LOSS OF CONSCIOUSNESS: 0
FEVER: 0
PHOTOPHOBIA: 0
SENSORY CHANGE: 0
HEADACHES: 0
FALLS: 1
NAUSEA: 0
FOCAL WEAKNESS: 0
DOUBLE VISION: 0
BLURRED VISION: 0
BRUISES/BLEEDS EASILY: 0
TINGLING: 0
CHILLS: 0

## 2025-02-07 NOTE — PROGRESS NOTES
Subjective     Amando Pleitez is a 74 y.o. male who presents with Wrist Injury (X pt fell on wrist this morning 2/7 hit head and hip but the wrist is painful and swelling, no limited ROM )       HPI:  Amando Pleitez is a 74 y.o. male who presents today for evaluation of an injury to his right wrist.  Patient was taking out the trash this morning.  Did not see a slab of ice at the bottom of his driveway.  He slipped and fell backwards and thinks he put his wrist out to break his fall.  His right wrist is what took the brunt of the impact.  He also hit his right hip a little bit but that is not bothering him.  He also.  Gently hit the side of his head.  He is not having any headaches, visual changes, dizziness.  He is not on blood thinners.  Reports possible strain or sprain to the right wrist previously.  No history of fractures that he is aware of.        Review of Systems   Constitutional:  Negative for chills and fever.   Eyes:  Negative for blurred vision, double vision and photophobia.   Gastrointestinal:  Negative for nausea and vomiting.   Musculoskeletal:  Positive for falls and joint pain (right wrist).   Neurological:  Negative for tingling, sensory change, focal weakness, loss of consciousness and headaches.   Endo/Heme/Allergies:  Does not bruise/bleed easily.          PMH:  has a past medical history of Hypercholesteremia and Vitamin D deficiency.    He has no past medical history of Allergy.  MEDS:   Current Outpatient Medications:     Multiple Vitamins-Minerals (CENTRUM SILVER PO), Take  by mouth every day., Disp: , Rfl:     VITAMIN D PO, Take  by mouth every day., Disp: , Rfl:     atorvastatin (LIPITOR) 40 MG Tab, Take 1 Tablet by mouth every morning., Disp: 100 Tablet, Rfl: 4    metoprolol SR (TOPROL XL) 25 MG TABLET SR 24 HR, Take 1 Tablet by mouth every day., Disp: 90 Tablet, Rfl: 4  ALLERGIES: No Known Allergies  SURGHX: History reviewed. No pertinent surgical history.  SOCHX:  reports  that he has never smoked. He has never used smokeless tobacco. He reports current alcohol use of about 2.4 oz of alcohol per week. He reports that he does not use drugs.  FH: Family history was reviewed, no pertinent findings to report      Objective     /64 (BP Location: Left arm, Patient Position: Sitting, BP Cuff Size: Adult long)   Pulse 60   Temp 36.3 °C (97.4 °F)   Resp 15   Ht 1.829 m (6')   Wt 101 kg (223 lb 6.4 oz)   SpO2 96%   BMI 30.30 kg/m²        Physical Exam  Constitutional:       General: He is not in acute distress.     Appearance: He is not diaphoretic.   HENT:      Head: Normocephalic and atraumatic.      Right Ear: External ear normal.      Left Ear: External ear normal.   Eyes:      Conjunctiva/sclera: Conjunctivae normal.      Pupils: Pupils are equal, round, and reactive to light.   Pulmonary:      Effort: Pulmonary effort is normal. No respiratory distress.   Musculoskeletal:      Right wrist: Swelling, tenderness and bony tenderness present. No snuff box tenderness. Decreased range of motion.      Cervical back: Normal range of motion.      Comments: Right wrist exhibits diffuse swelling on the dorsum.  Scattered tenderness that is most notable over the distal radius but also has some mild tenderness over the proximal ulnar hand and distal ulna.  Very mildly decreased range of motion with all movement secondary to pain.  This is most pronounced with extension and ulnar deviation.  5/5  strength.  Distal neurovascular status intact.  Cap refill of all fingers less than 2 seconds.   Skin:     Findings: No rash.   Neurological:      Mental Status: He is alert and oriented to person, place, and time.   Psychiatric:         Mood and Affect: Mood and affect normal.         Cognition and Memory: Memory normal.         Judgment: Judgment normal.                   DX-WRIST-COMPLETE 3+ RIGHT  IMPRESSION:  1.  Dorsal soft tissue swelling of RIGHT wrist  2.  Probable articular fracture  of the dorsal aspect distal radius.  3.  Ossific density at the dorsal aspect of the wrist suggests triquetral fracture.    Assessment & Plan     1. Injury of right wrist, initial encounter  - DX-WRIST-COMPLETE 3+ RIGHT; Future  - Referral to Orthopedics    2. Other closed intra-articular fracture of distal end of right radius, initial encounter  - Referral to Orthopedics    3. Closed nondisplaced fracture of triquetrum of right wrist, initial encounter  - Referral to Orthopedics    Patient presenting for evaluation of a right wrist injury.  X-rays are suggestive of intra articular fracture of the dorsal aspect of the distal radius.  Also a probable triquetral fracture noted.  He was placed in sugar-tong Ortho-Glass splint in clinic.  Sling also provided for comfort.  RICE therapies and use of OTC analgesics discussed.  Sling provided for comfort.  Urgent referral to orthopedics placed for more definitive evaluation and management.        Differential Diagnosis, natural history, and supportive care discussed. Return to the Urgent Care or follow up with your PCP if symptoms fail to resolve, or for any new or worsening symptoms. Emergency room precautions discussed. Patient and/or family appears understanding of information.

## 2025-06-16 ENCOUNTER — APPOINTMENT (OUTPATIENT)
Dept: SLEEP MEDICINE | Facility: MEDICAL CENTER | Age: 75
End: 2025-06-16
Attending: INTERNAL MEDICINE
Payer: MEDICARE